# Patient Record
Sex: MALE | Race: WHITE | Employment: OTHER | URBAN - METROPOLITAN AREA
[De-identification: names, ages, dates, MRNs, and addresses within clinical notes are randomized per-mention and may not be internally consistent; named-entity substitution may affect disease eponyms.]

---

## 2017-10-23 ENCOUNTER — OFFICE VISIT (OUTPATIENT)
Dept: ORTHOPEDIC SURGERY | Age: 67
End: 2017-10-23

## 2017-10-23 VITALS
BODY MASS INDEX: 29.16 KG/M2 | HEART RATE: 76 BPM | DIASTOLIC BLOOD PRESSURE: 80 MMHG | WEIGHT: 220 LBS | HEIGHT: 73 IN | SYSTOLIC BLOOD PRESSURE: 138 MMHG

## 2017-10-23 DIAGNOSIS — M65.322 TRIGGER INDEX FINGER OF LEFT HAND: ICD-10-CM

## 2017-10-23 DIAGNOSIS — M79.645 FINGER PAIN, LEFT: Primary | ICD-10-CM

## 2017-10-23 PROCEDURE — 73140 X-RAY EXAM OF FINGER(S): CPT | Performed by: PHYSICIAN ASSISTANT

## 2017-10-23 PROCEDURE — 99213 OFFICE O/P EST LOW 20 MIN: CPT | Performed by: PHYSICIAN ASSISTANT

## 2017-10-23 NOTE — PATIENT INSTRUCTIONS
Trigger Finger: Care Instructions  Your Care Instructions  A trigger finger is a finger stuck in a bent position. The bent finger usually straightens out on its own. A trigger finger can be painful, but it normally is not a serious problem. Trigger fingers seem to occur more in some groups of people. These include people who have diabetes or arthritis or who have injured their hands in the past. This problem also occurs in musicians and people who  tools often. Rest, exercises, and other things you can do at home may help your trigger finger relax so that it can bend as it should. You may get a corticosteroid shot. This can reduce swelling and pain. Your doctor may put a splint on your finger. This will give your finger some rest and avoid irritating the joint. You may need surgery if the finger keeps locking in a bent position. Follow-up care is a key part of your treatment and safety. Be sure to make and go to all appointments, and call your doctor if you are having problems. It's also a good idea to know your test results and keep a list of the medicines you take. How can you care for yourself at home? · If your doctor put a splint on your finger, wear the splint as directed. Do not remove it until your doctor says you can. · You may need to change your activities to avoid movements that irritate the finger. · If your finger is swollen, put ice or a cold pack on your finger for 10 to 20 minutes at a time. Try to do this every 1 to 2 hours for the next 3 days (when you are awake) or until the swelling goes down. Put a thin cloth between the ice and your skin. · Prop up your hand on a pillow when you ice it or anytime you sit or lie down during the next 3 days. Try to keep it above the level of your heart. This will help reduce swelling. · Take your medicines exactly as prescribed. Call your doctor if you think you are having a problem with your medicine.   · Ask your doctor if you can take an over-the-counter pain medicine, such as acetaminophen (Tylenol), ibuprofen (Advil, Motrin), or naproxen (Aleve). Be safe with medicines. Read and follow all instructions on the label. · If your doctor recommends exercises, do them as directed. When should you call for help? Call your doctor now or seek immediate medical care if:  · Your finger locks in a bent position and will not straighten. Watch closely for changes in your health, and be sure to contact your doctor if:  · You do not get better as expected. Where can you learn more? Go to https://[employer]. org and sign in to your [employer] account. Enter M826 in the [employer] box to learn more about \"Trigger Finger: Care Instructions. \"     If you do not have an account, please click on the \"Sign Up Now\" link. Current as of: March 21, 2017  Content Version: 11.3  © 1084-9187 [employer]. Care instructions adapted under license by [facility] ([facility]). If you have questions about a medical condition or this instruction, always ask your healthcare professional. [place] [address] any warranty or liability for your use of this information.       Follow up with Dr Jveon Summers in 1 week

## 2017-10-30 ENCOUNTER — OFFICE VISIT (OUTPATIENT)
Dept: ORTHOPEDIC SURGERY | Age: 67
End: 2017-10-30

## 2017-10-30 VITALS
WEIGHT: 220.02 LBS | SYSTOLIC BLOOD PRESSURE: 145 MMHG | HEIGHT: 73 IN | DIASTOLIC BLOOD PRESSURE: 84 MMHG | HEART RATE: 78 BPM | BODY MASS INDEX: 29.16 KG/M2

## 2017-10-30 DIAGNOSIS — M65.322 TRIGGER INDEX FINGER OF LEFT HAND: Primary | ICD-10-CM

## 2017-10-30 PROCEDURE — 99203 OFFICE O/P NEW LOW 30 MIN: CPT | Performed by: ORTHOPAEDIC SURGERY

## 2017-10-30 PROCEDURE — 20550 NJX 1 TENDON SHEATH/LIGAMENT: CPT | Performed by: ORTHOPAEDIC SURGERY

## 2017-10-30 NOTE — PROGRESS NOTES
without enlargement or induration. Skin intact without lymphadenopathy, discoloration, or abnormal temperature. Vascular: There is intact, symmetric circulation in both upper extremities. Musculoskeletal       Cervical spine, shoulders, elbows, wrist:  satisfactory baseline range of motion, strength, and stability bilaterally        left index finger:                               moderate  tenderness at the A1 pulley                            moderate triggering with flexion and     extension. All other fingers/thumbs:   No tenderness at A1 pulley, no triggering with                                                   flexion/extension, and no locking    Neurological:  Sensation is subjectively normal in hands bilaterally    Radiology:     None needed today    Additional Studies:    IMPRESSION AND PLAN: Left index finger trigger digit    I discussed the entity of trigger finger with the patient. I fully outlined the mechanics behind the triggering and locking and appropriate conservative and surgical options. All their questions were answered fully. At this point the patient is symptomatic, and injection was recommended. Patient was in agreement. The risks and benefits of cortisone injection were discussed thoroughly. Risks discussed included infection, adverse drug reaction, increased pain post-injection, skin de-pigmentation, fatty atrophy, and persistent clinical symptoms despite injection. Use of ethyl chloride spray during injection to decrease injection site pain was discussed. The injection was given after cleansing the skin with alcohol. The left index finger trigger digit and flexor tendon sheath was injected with 1 mL of 1% lidocaine without epinephrine and 1 mL of Celestone without difficulty. The patient tolerated the injection well and a Band-aid was placed. Follow-up if symptoms are not resolving.     We appreciate the patient referral.  All questions and concerns were addressed today. Patient is in agreement with the plan.         Bashir Muniz MD  Hand & Upper Extremity Surgery  [address] [facility] ([facility])

## 2018-03-13 ENCOUNTER — OFFICE VISIT (OUTPATIENT)
Dept: ORTHOPEDIC SURGERY | Age: 68
End: 2018-03-13

## 2018-03-13 VITALS — HEIGHT: 71 IN | WEIGHT: 220.02 LBS | BODY MASS INDEX: 30.8 KG/M2

## 2018-03-13 DIAGNOSIS — M65.322 TRIGGER INDEX FINGER OF LEFT HAND: ICD-10-CM

## 2018-03-13 DIAGNOSIS — M79.644 FINGER PAIN, RIGHT: Primary | ICD-10-CM

## 2018-03-13 PROCEDURE — 99213 OFFICE O/P EST LOW 20 MIN: CPT | Performed by: ORTHOPAEDIC SURGERY

## 2018-03-13 PROCEDURE — 20550 NJX 1 TENDON SHEATH/LIGAMENT: CPT | Performed by: ORTHOPAEDIC SURGERY

## 2018-03-13 NOTE — PROGRESS NOTES
CC:  Left index finger pain and clicking    HISTORY OF PRESENT ILLNESS:   Margaret Perdomo is a 69 y.o. male right hand dominant, nondiabetic, who presents for evaluation and treatment of left index finger triggering that began approximately 3 months ago, but has worsened over the past 3-4 weeks with intermittent locking. This affects daily activities involving gripping. Treatment to date has been NSAID's and nighttime immobilization, without significant relief. Patient was seen here proximally 5 months ago for pain and triggering of the right index finger. Cortisone injection was given at that point with complete resolution of symptoms. Medical History:  Past Medical History:   Diagnosis Date    Hypertension     Migraines      Past Surgical History:   Procedure Laterality Date    CARDIAC CATHETERIZATION  2009    ablation for irrregular heartbeat     No family history on file. Social History     Social History    Marital status:      Spouse name: N/A    Number of children: N/A    Years of education: N/A     Social History Main Topics    Smoking status: Never Smoker    Smokeless tobacco: Never Used    Alcohol use 6.0 oz/week     5 Glasses of wine, 5 Cans of beer per week    Drug use: No    Sexual activity: Not Asked     Other Topics Concern    None     Social History Narrative    None     Current Outpatient Prescriptions   Medication Sig Dispense Refill    metoprolol (TOPROL-XL) 50 MG XL tablet Take 50 mg by mouth daily. No current facility-administered medications for this visit. Allergies   Allergen Reactions    Shellfish-Derived Products Shortness Of Breath       ROS:  ROS neg except for positives in HPI    PHYSICAL EXAMINATION:   Gen/Psych: Examination reveals a pleasant individual in no acute distress. The patient is oriented to time, place and person. The patient's mood and affect are appropriate. Lymph:  The lymphatic examination bilaterally reveals all areas to be

## 2018-03-13 NOTE — PROGRESS NOTES
INJECTION ADMINISTRATION DETAILS:    Date & Time:  3/13/18 3:27 PM  Site & Comments: left index finger  Administered by Dr Fidel Samuel    Betamethasone (30mg/mL)  #of CC:1  NDC #: 2913-8440-12  Lot #: [id number]  EXP: 8/2019    1% Lidocaine ( 10mg/mL)  # of CC : 1  NDC #: 3009-9915-36  LOT# :-[id number]  EXP :5/2019

## 2018-04-03 ENCOUNTER — OFFICE VISIT (OUTPATIENT)
Dept: ORTHOPEDIC SURGERY | Age: 68
End: 2018-04-03

## 2018-04-03 VITALS — HEIGHT: 71 IN | WEIGHT: 220.02 LBS | BODY MASS INDEX: 30.8 KG/M2

## 2018-04-03 DIAGNOSIS — M65.322 TRIGGER INDEX FINGER OF LEFT HAND: Primary | ICD-10-CM

## 2018-04-03 PROCEDURE — 99212 OFFICE O/P EST SF 10 MIN: CPT | Performed by: ORTHOPAEDIC SURGERY

## 2018-04-03 RX ORDER — SULFAMETHOXAZOLE AND TRIMETHOPRIM 800; 160 MG/1; MG/1
1 TABLET ORAL 2 TIMES DAILY
Qty: 20 TABLET | Refills: 0 | Status: SHIPPED | OUTPATIENT
Start: 2018-04-03 | End: 2018-04-11 | Stop reason: SDUPTHER

## 2018-04-05 ENCOUNTER — OFFICE VISIT (OUTPATIENT)
Dept: ORTHOPEDIC SURGERY | Age: 68
End: 2018-04-05

## 2018-04-05 DIAGNOSIS — L03.012 PARONYCHIA OF FINGER, LEFT: ICD-10-CM

## 2018-04-05 DIAGNOSIS — L03.012 PARONYCHIA OF LEFT INDEX FINGER: Primary | ICD-10-CM

## 2018-04-05 PROCEDURE — 99214 OFFICE O/P EST MOD 30 MIN: CPT | Performed by: ORTHOPAEDIC SURGERY

## 2018-04-05 PROCEDURE — 26011 DRAINAGE OF FINGER ABSCESS: CPT | Performed by: ORTHOPAEDIC SURGERY

## 2018-04-11 ENCOUNTER — OFFICE VISIT (OUTPATIENT)
Dept: ORTHOPEDIC SURGERY | Age: 68
End: 2018-04-11

## 2018-04-11 VITALS — HEIGHT: 71 IN | WEIGHT: 220.02 LBS | BODY MASS INDEX: 30.8 KG/M2

## 2018-04-11 DIAGNOSIS — L03.012 PARONYCHIA OF FINGER, LEFT: Primary | ICD-10-CM

## 2018-04-11 PROCEDURE — 99024 POSTOP FOLLOW-UP VISIT: CPT | Performed by: ORTHOPAEDIC SURGERY

## 2018-04-11 RX ORDER — CLINDAMYCIN HYDROCHLORIDE 150 MG/1
150 CAPSULE ORAL 4 TIMES DAILY
Qty: 40 CAPSULE | Refills: 0 | Status: SHIPPED | OUTPATIENT
Start: 2018-04-11 | End: 2018-04-21

## 2018-04-11 RX ORDER — SULFAMETHOXAZOLE AND TRIMETHOPRIM 800; 160 MG/1; MG/1
1 TABLET ORAL 2 TIMES DAILY
Qty: 20 TABLET | Refills: 0 | Status: SHIPPED | OUTPATIENT
Start: 2018-04-13 | End: 2018-04-23

## 2018-04-18 ENCOUNTER — OFFICE VISIT (OUTPATIENT)
Dept: ORTHOPEDIC SURGERY | Age: 68
End: 2018-04-18

## 2018-04-18 VITALS — BODY MASS INDEX: 30.8 KG/M2 | WEIGHT: 220.02 LBS | HEIGHT: 71 IN

## 2018-04-18 DIAGNOSIS — L03.012 PARONYCHIA OF FINGER, LEFT: Primary | ICD-10-CM

## 2018-04-18 PROCEDURE — 99212 OFFICE O/P EST SF 10 MIN: CPT | Performed by: ORTHOPAEDIC SURGERY

## 2018-08-21 ENCOUNTER — TELEPHONE (OUTPATIENT)
Dept: ORTHOPEDIC SURGERY | Age: 68
End: 2018-08-21

## 2018-08-21 ENCOUNTER — OFFICE VISIT (OUTPATIENT)
Dept: ORTHOPEDIC SURGERY | Age: 68
End: 2018-08-21

## 2018-08-21 VITALS — HEIGHT: 73 IN | WEIGHT: 215 LBS | BODY MASS INDEX: 28.49 KG/M2

## 2018-08-21 DIAGNOSIS — M65.331 TRIGGER MIDDLE FINGER OF RIGHT HAND: ICD-10-CM

## 2018-08-21 DIAGNOSIS — M65.322 TRIGGER INDEX FINGER OF LEFT HAND: Primary | ICD-10-CM

## 2018-08-21 PROCEDURE — 99213 OFFICE O/P EST LOW 20 MIN: CPT | Performed by: ORTHOPAEDIC SURGERY

## 2018-08-21 RX ORDER — AMLODIPINE BESYLATE 10 MG/1
TABLET ORAL
Refills: 11 | COMMUNITY
Start: 2018-07-24

## 2018-08-22 ENCOUNTER — TELEPHONE (OUTPATIENT)
Dept: ORTHOPEDIC SURGERY | Age: 68
End: 2018-08-22

## 2018-08-22 NOTE — PROGRESS NOTES
this transcription was created using voice recognition software. Any errors are unintentional and may be due to voice recognition transcription.

## 2018-09-26 ENCOUNTER — OFFICE VISIT (OUTPATIENT)
Dept: ORTHOPEDIC SURGERY | Age: 68
End: 2018-09-26
Payer: MEDICARE

## 2018-09-26 VITALS
BODY MASS INDEX: 28.49 KG/M2 | WEIGHT: 215 LBS | HEART RATE: 69 BPM | SYSTOLIC BLOOD PRESSURE: 129 MMHG | HEIGHT: 73 IN | DIASTOLIC BLOOD PRESSURE: 85 MMHG

## 2018-09-26 DIAGNOSIS — M65.331 TRIGGER MIDDLE FINGER OF RIGHT HAND: Primary | ICD-10-CM

## 2018-09-26 PROCEDURE — 20550 NJX 1 TENDON SHEATH/LIGAMENT: CPT | Performed by: ORTHOPAEDIC SURGERY

## 2018-09-26 PROCEDURE — 99213 OFFICE O/P EST LOW 20 MIN: CPT | Performed by: ORTHOPAEDIC SURGERY

## 2019-01-22 ENCOUNTER — TELEPHONE (OUTPATIENT)
Dept: ORTHOPEDIC SURGERY | Age: 69
End: 2019-01-22

## 2019-01-24 ENCOUNTER — TELEPHONE (OUTPATIENT)
Dept: ORTHOPEDIC SURGERY | Age: 69
End: 2019-01-24

## 2019-01-28 ENCOUNTER — TELEPHONE (OUTPATIENT)
Dept: ORTHOPEDIC SURGERY | Age: 69
End: 2019-01-28

## 2019-02-04 ENCOUNTER — HOSPITAL ENCOUNTER (OUTPATIENT)
Age: 69
Setting detail: OUTPATIENT SURGERY
Discharge: HOME | End: 2019-02-04
Attending: ORTHOPAEDIC SURGERY | Admitting: ORTHOPAEDIC SURGERY
Payer: MEDICARE

## 2019-02-04 VITALS
OXYGEN SATURATION: 98 % | HEART RATE: 57 BPM | DIASTOLIC BLOOD PRESSURE: 71 MMHG | RESPIRATION RATE: 16 BRPM | BODY MASS INDEX: 28.49 KG/M2 | HEIGHT: 73 IN | WEIGHT: 215 LBS | TEMPERATURE: 98.1 F | SYSTOLIC BLOOD PRESSURE: 112 MMHG

## 2019-02-04 PROCEDURE — 2500000003 HC RX 250 WO HCPCS: Performed by: ORTHOPAEDIC SURGERY

## 2019-02-04 PROCEDURE — 3600000012 HC SURGERY LEVEL 2 ADDTL 15MIN: Performed by: ORTHOPAEDIC SURGERY

## 2019-02-04 PROCEDURE — 7100000011 HC PHASE II RECOVERY - ADDTL 15 MIN: Performed by: ORTHOPAEDIC SURGERY

## 2019-02-04 PROCEDURE — 2709999900 HC NON-CHARGEABLE SUPPLY: Performed by: ORTHOPAEDIC SURGERY

## 2019-02-04 PROCEDURE — 7100000010 HC PHASE II RECOVERY - FIRST 15 MIN: Performed by: ORTHOPAEDIC SURGERY

## 2019-02-04 PROCEDURE — 3600000002 HC SURGERY LEVEL 2 BASE: Performed by: ORTHOPAEDIC SURGERY

## 2019-02-04 RX ORDER — BUPIVACAINE HYDROCHLORIDE 2.5 MG/ML
INJECTION, SOLUTION EPIDURAL; INFILTRATION; INTRACAUDAL
Status: DISCONTINUED
Start: 2019-02-04 | End: 2019-02-04 | Stop reason: HOSPADM

## 2019-02-04 RX ORDER — LIDOCAINE HYDROCHLORIDE 10 MG/ML
INJECTION, SOLUTION EPIDURAL; INFILTRATION; INTRACAUDAL; PERINEURAL
Status: DISCONTINUED
Start: 2019-02-04 | End: 2019-02-04 | Stop reason: HOSPADM

## 2019-02-04 ASSESSMENT — PAIN SCALES - GENERAL
PAINLEVEL_OUTOF10: 0
PAINLEVEL_OUTOF10: 0

## 2019-02-04 ASSESSMENT — PAIN - FUNCTIONAL ASSESSMENT: PAIN_FUNCTIONAL_ASSESSMENT: 0-10

## 2019-02-12 ENCOUNTER — OFFICE VISIT (OUTPATIENT)
Dept: ORTHOPEDIC SURGERY | Age: 69
End: 2019-02-12

## 2019-02-12 VITALS — WEIGHT: 214.95 LBS | BODY MASS INDEX: 28.49 KG/M2 | HEIGHT: 73 IN

## 2019-02-12 DIAGNOSIS — M65.331 TRIGGER MIDDLE FINGER OF RIGHT HAND: Primary | ICD-10-CM

## 2019-02-12 PROCEDURE — 99024 POSTOP FOLLOW-UP VISIT: CPT | Performed by: ORTHOPAEDIC SURGERY

## 2019-04-05 ENCOUNTER — OFFICE VISIT (OUTPATIENT)
Dept: ORTHOPEDIC SURGERY | Age: 69
End: 2019-04-05
Payer: MEDICARE

## 2019-04-05 VITALS — HEIGHT: 73 IN | WEIGHT: 214.95 LBS | BODY MASS INDEX: 28.49 KG/M2

## 2019-04-05 DIAGNOSIS — M65.331 TRIGGER MIDDLE FINGER OF RIGHT HAND: Primary | ICD-10-CM

## 2019-04-05 PROCEDURE — 99024 POSTOP FOLLOW-UP VISIT: CPT | Performed by: ORTHOPAEDIC SURGERY

## 2019-04-05 PROCEDURE — 20550 NJX 1 TENDON SHEATH/LIGAMENT: CPT | Performed by: ORTHOPAEDIC SURGERY

## 2019-04-05 NOTE — PROGRESS NOTES
INJECTION ADMINISTRATION DETAILS:    Date & Time:  4/5/19 8:57 AM  Site & Comments: right middle finger  Administered by Dr Ned Stark    Betamethasone (30mg/mL)  #of CC:1  NDC #: 5840-6414-60  Lot #: [id number]  EXP: 6/2020    1% Lidocaine ( 10mg/mL)  # of CC : 1  NDC #: 9706-8954-47  LOT# :[id number]  EXP :5/2019

## 2019-04-05 NOTE — PROGRESS NOTES
Chief Complaint   Patient presents with    Follow-up     Right middle finger trigger release sx 2/4/2019       HISTORY OF PRESENT ILLNESS:  Barrie Mancini is a 77 y.o.  patient here for post-op follow up after right long trigger digit release. Surgery was now 2 months ago. The patient is doing quite well overall. ROS:  ROS neg     MEDICAL HISTORY:  Patient's medications, allergies, past medical, surgical, social and family histories were reviewed and updated as appropriate. PHYSICAL EXAMINATION:  Alert and pleasant, no distress. The right hand and wrist is examined, including the operative digit. The wound is healed without signs of infection or dehiscense. Mild swelling still noted. There is satisfactory range of motion of the wrist and fingers. No clicking or triggering noted today. Mild finger stiffness. Fingers are well perfused and sensate. IMPRESSION AND PLAN:  right long trigger finger  Doing well after right long trigger digit release. We discussed scar and finger/tendon massage, progressive ROM of hand and fingers. Activities may be advanced depending on comfort. If patient desires hand therapy at any point during recovery, we will be happy to place this order. We discussed a postoperative right middle finger trigger digit injection today to help decrease the inflammation around the tendons. He was in agreement. The risks and benefits of cortisone injection were discussed thoroughly. Risks discussed included infection, adverse drug reaction, increased pain post-injection, skin de-pigmentation, fatty atrophy, and persistent clinical symptoms despite injection. Use of ethyl chloride spray during injection to decrease injection site pain was discussed. The injection was given after cleansing the skin with alcohol. Right middle finger and flexor tendon sheath were injected with 1 mL of 1% lidocaine without epinephrine and 1 mL of Celestone without difficulty.   The patient tolerated the injection well and a Band-aid was placed. Follow-up in 3 months if symptoms are not gone. All questions and concerns were addressed today. Patient is in agreement with the plan.         Deepthi Esquivel MD  Hand & Upper Extremity Surgery  [facility] of [facility] ([facility])

## 2019-09-24 ENCOUNTER — OFFICE VISIT (OUTPATIENT)
Dept: ORTHOPEDIC SURGERY | Age: 69
End: 2019-09-24
Payer: MEDICARE

## 2019-09-24 ENCOUNTER — HOSPITAL ENCOUNTER (OUTPATIENT)
Dept: OCCUPATIONAL THERAPY | Age: 69
Setting detail: THERAPIES SERIES
Discharge: HOME | End: 2019-09-24
Payer: MEDICARE

## 2019-09-24 VITALS — WEIGHT: 214.95 LBS | HEIGHT: 73 IN | BODY MASS INDEX: 28.49 KG/M2

## 2019-09-24 DIAGNOSIS — M25.641 FINGER STIFFNESS, RIGHT: ICD-10-CM

## 2019-09-24 DIAGNOSIS — M65.331 TRIGGER MIDDLE FINGER OF RIGHT HAND: Primary | ICD-10-CM

## 2019-09-24 PROCEDURE — L3925 FO PIP DIP JNT/SPRNG PRE OTS: HCPCS | Performed by: OCCUPATIONAL THERAPIST

## 2019-09-24 PROCEDURE — 20550 NJX 1 TENDON SHEATH/LIGAMENT: CPT | Performed by: ORTHOPAEDIC SURGERY

## 2019-09-24 PROCEDURE — 99213 OFFICE O/P EST LOW 20 MIN: CPT | Performed by: ORTHOPAEDIC SURGERY

## 2019-09-24 RX ORDER — BETAMETHASONE SODIUM PHOSPHATE AND BETAMETHASONE ACETATE 3; 3 MG/ML; MG/ML
12 INJECTION, SUSPENSION INTRA-ARTICULAR; INTRALESIONAL; INTRAMUSCULAR; SOFT TISSUE ONCE
Status: COMPLETED | OUTPATIENT
Start: 2019-09-24 | End: 2019-09-24

## 2019-09-24 RX ADMIN — BETAMETHASONE SODIUM PHOSPHATE AND BETAMETHASONE ACETATE 12 MG: 3; 3 INJECTION, SUSPENSION INTRA-ARTICULAR; INTRALESIONAL; INTRAMUSCULAR; SOFT TISSUE at 09:14

## 2019-09-24 NOTE — PROGRESS NOTES
Chief Complaint   Patient presents with    Follow-up     Right middle finger trigger release sx 2/4/2019       HISTORY OF PRESENT ILLNESS:  Sonu Ashby is a 68 y.o.  patient here for repeat evaluation after undergoing right middle finger trigger digit release almost 7 months ago. Triggering is gone. He has good  strength. He has stiffness of the middle finger in the morning and has not been able to fully regain extension of the PIP joint    ROS:  ROS neg     Past medical history is reviewed again today, no changes to report    PHYSICAL EXAMINATION:  Patient is alert and pleasant, in no acute distress. The affected extremity is examined today. Right middle finger reveals a 15 degree PIP joint flexion contracture that is not passively correctable. Full flexion. Intact tendon function. No pain or triggering over the A1 pulley of the right middle finger. Prior incision is well-healed. Good  strength. Digits sensate    X-rays: None today    IMPRESSION AND PLAN: Right middle finger trigger digit, right middle finger PIP joint stiffness  We discussed repeat cortisone injection into the right middle finger flexor tendon sheath today along with a therapy evaluation for a dynamic PIP joint extension brace to be worn at night. Follow-up in the next 8 to 10 weeks for repeat assessment. The risks and benefits of cortisone injection were discussed thoroughly. Risks discussed included infection, adverse drug reaction, increased pain post-injection, skin de-pigmentation, fatty atrophy, and persistent clinical symptoms despite injection. Use of ethyl chloride spray during injection to decrease injection site pain was discussed. The injection was given after cleansing the skin with alcohol. Right middle finger flexor tendon sheath was injected with 1 cc of 1% lidocaine without epinephrine 1 cc of Celestone without difficulty. The patient tolerated the injection well and a Band-aid was placed.      All

## 2020-01-14 ENCOUNTER — OFFICE VISIT (OUTPATIENT)
Dept: ORTHOPEDIC SURGERY | Age: 70
End: 2020-01-14
Payer: MEDICARE

## 2020-01-14 VITALS — BODY MASS INDEX: 28.49 KG/M2 | WEIGHT: 214.95 LBS | HEIGHT: 73 IN

## 2020-01-14 PROBLEM — M65.332 TRIGGER MIDDLE FINGER OF LEFT HAND: Status: ACTIVE | Noted: 2017-10-23

## 2020-01-14 PROCEDURE — 99213 OFFICE O/P EST LOW 20 MIN: CPT | Performed by: ORTHOPAEDIC SURGERY

## 2020-01-14 PROCEDURE — 20550 NJX 1 TENDON SHEATH/LIGAMENT: CPT | Performed by: ORTHOPAEDIC SURGERY

## 2020-01-14 RX ORDER — BETAMETHASONE SODIUM PHOSPHATE AND BETAMETHASONE ACETATE 3; 3 MG/ML; MG/ML
12 INJECTION, SUSPENSION INTRA-ARTICULAR; INTRALESIONAL; INTRAMUSCULAR; SOFT TISSUE ONCE
Status: COMPLETED | OUTPATIENT
Start: 2020-01-14 | End: 2020-01-14

## 2020-01-14 RX ADMIN — BETAMETHASONE SODIUM PHOSPHATE AND BETAMETHASONE ACETATE 12 MG: 3; 3 INJECTION, SUSPENSION INTRA-ARTICULAR; INTRALESIONAL; INTRAMUSCULAR; SOFT TISSUE at 08:37

## 2020-01-14 NOTE — PROGRESS NOTES
1% Lidocaine ( 10mg/mL)  # of CC : 1  NDC #: 6158-5265-29  LOT# :[id number]  EXP :11/2020
Relationship status: Not on file    Intimate partner violence:     Fear of current or ex partner: Not on file     Emotionally abused: Not on file     Physically abused: Not on file     Forced sexual activity: Not on file   Other Topics Concern    Not on file   Social History Narrative    Not on file     Current Outpatient Medications   Medication Sig Dispense Refill    amLODIPine (NORVASC) 10 MG tablet TAKE 1 TABLET BY MOUTH DAILY. 11    metoprolol (TOPROL-XL) 50 MG XL tablet Take 50 mg by mouth daily. No current facility-administered medications for this visit. Allergies   Allergen Reactions    Shellfish-Derived Products Shortness Of Breath       ROS:  ROS neg except for positives in HPI    PHYSICAL EXAMINATION:   Gen/Psych: Examination reveals a pleasant individual in no acute distress. The patient is oriented to time, place and person. The patient's mood and affect are appropriate. Lymph: The lymphatic examination bilaterally reveals all areas to be without enlargement or induration. Skin intact without lymphadenopathy, discoloration, or abnormal temperature. Vascular: There is intact, symmetric circulation in both upper extremities. Musculoskeletal       Cervical spine, shoulders, elbows, wrist:  satisfactory baseline range of motion, strength, and stability bilaterally        left long finger:                               moderate  tenderness at the A1 pulley                            moderate triggering with flexion and     extension. All other fingers/thumbs:   No tenderness at A1 pulley, no triggering with                                                   flexion/extension, and no locking    Neurological:  Sensation is subjectively normal in hands bilaterally    Radiology:         IMPRESSION AND PLAN: Left middle finger trigger digit    I discussed the entity of trigger finger with the patient.  I fully outlined the mechanics behind the triggering and locking and

## 2020-06-09 ENCOUNTER — OFFICE VISIT (OUTPATIENT)
Dept: ORTHOPEDIC SURGERY | Age: 70
End: 2020-06-09
Payer: MEDICARE

## 2020-06-09 VITALS — WEIGHT: 214.95 LBS | HEIGHT: 73 IN | BODY MASS INDEX: 28.49 KG/M2

## 2020-06-09 PROCEDURE — 99213 OFFICE O/P EST LOW 20 MIN: CPT | Performed by: ORTHOPAEDIC SURGERY

## 2020-06-09 PROCEDURE — 20550 NJX 1 TENDON SHEATH/LIGAMENT: CPT | Performed by: ORTHOPAEDIC SURGERY

## 2020-06-09 RX ORDER — BETAMETHASONE SODIUM PHOSPHATE AND BETAMETHASONE ACETATE 3; 3 MG/ML; MG/ML
1 INJECTION, SUSPENSION INTRA-ARTICULAR; INTRALESIONAL; INTRAMUSCULAR; SOFT TISSUE ONCE
Status: COMPLETED | OUTPATIENT
Start: 2020-06-09 | End: 2020-06-09

## 2020-06-09 RX ORDER — LIDOCAINE HYDROCHLORIDE 10 MG/ML
1 INJECTION, SOLUTION INFILTRATION; PERINEURAL ONCE
Status: COMPLETED | OUTPATIENT
Start: 2020-06-09 | End: 2020-06-09

## 2020-06-09 RX ADMIN — LIDOCAINE HYDROCHLORIDE 1 ML: 10 INJECTION, SOLUTION INFILTRATION; PERINEURAL at 09:20

## 2020-06-09 RX ADMIN — BETAMETHASONE SODIUM PHOSPHATE AND BETAMETHASONE ACETATE 1.02 MG: 3; 3 INJECTION, SUSPENSION INTRA-ARTICULAR; INTRALESIONAL; INTRAMUSCULAR; SOFT TISSUE at 09:19

## 2020-10-19 ENCOUNTER — OFFICE VISIT (OUTPATIENT)
Dept: ORTHOPEDIC SURGERY | Age: 70
End: 2020-10-19
Payer: MEDICARE

## 2020-10-19 VITALS — BODY MASS INDEX: 28.99 KG/M2 | HEIGHT: 72 IN | WEIGHT: 214 LBS

## 2020-10-19 PROCEDURE — 20550 NJX 1 TENDON SHEATH/LIGAMENT: CPT | Performed by: ORTHOPAEDIC SURGERY

## 2020-10-19 PROCEDURE — 99213 OFFICE O/P EST LOW 20 MIN: CPT | Performed by: ORTHOPAEDIC SURGERY

## 2020-10-19 RX ORDER — LIDOCAINE HYDROCHLORIDE 10 MG/ML
20 INJECTION, SOLUTION INFILTRATION; PERINEURAL ONCE
Status: COMPLETED | OUTPATIENT
Start: 2020-10-19 | End: 2020-10-19

## 2020-10-19 RX ORDER — BETAMETHASONE SODIUM PHOSPHATE AND BETAMETHASONE ACETATE 3; 3 MG/ML; MG/ML
12 INJECTION, SUSPENSION INTRA-ARTICULAR; INTRALESIONAL; INTRAMUSCULAR; SOFT TISSUE ONCE
Status: COMPLETED | OUTPATIENT
Start: 2020-10-19 | End: 2020-10-19

## 2020-10-19 RX ADMIN — LIDOCAINE HYDROCHLORIDE 20 ML: 10 INJECTION, SOLUTION INFILTRATION; PERINEURAL at 13:13

## 2020-10-19 RX ADMIN — BETAMETHASONE SODIUM PHOSPHATE AND BETAMETHASONE ACETATE 12 MG: 3; 3 INJECTION, SUSPENSION INTRA-ARTICULAR; INTRALESIONAL; INTRAMUSCULAR; SOFT TISSUE at 13:11

## 2020-10-19 NOTE — PROGRESS NOTES
Chief Complaint   Patient presents with    Follow-up     check left middle finger       HISTORY OF PRESENT ILLNESS:  Lexi Quintanilla is a 65 y.o.  patient here for repeat evaluation regarding left middle finger triggering. Cortisone injections in the past of helped for several months. He denies new injury. Comes in today with some recurrence of symptoms over the past couple weeks. ROS:  ROS neg     Past medical history is reviewed again today, no changes to report    PHYSICAL EXAMINATION:  Patient is alert and pleasant, in no acute distress. The affected extremity is examined today. Pain over the A1 pulley left middle finger, palpable click but no LOCk. Mild swelling. No prior injection site change. Fingers warm and sensate      IMPRESSION AND PLAN: left middle finger trigger digit  We repeat cortisone injection versus trigger digit release. He was hopeful to avoid surgery still at this point and does request repeat injection if possible. The risks and benefits of cortisone injection were discussed thoroughly. Risks discussed included infection, adverse drug reaction, increased pain post-injection, skin de-pigmentation, fatty atrophy, and persistent clinical symptoms despite injection. Use of ethyl chloride spray during injection to decrease injection site pain was discussed. The injection was given after cleansing the skin with alcohol. Left middle finger trigger digit and flexor tendon sheath was injected with 1 cc of 1% lidocaine without epinephrine and 1 cc of Celestone without difficulty. The patient tolerated the injection well and a Band-aid was placed. Conservative measures as outlined previously and follow-up as needed. All questions and concerns were addressed today. Patient is in agreement with the plan.         Harry Harper MD  Hand & Upper Extremity Surgery  [facility]  A partner of [facility] ([facility])        Please note that this transcription was created using voice recognition software. Any errors are unintentional and may be due to voice recognition transcription.

## 2020-10-26 ENCOUNTER — OFFICE VISIT (OUTPATIENT)
Dept: ORTHOPEDIC SURGERY | Age: 70
End: 2020-10-26
Payer: MEDICARE

## 2020-10-26 VITALS — WEIGHT: 214 LBS | HEIGHT: 72 IN | BODY MASS INDEX: 28.99 KG/M2

## 2020-10-26 PROCEDURE — 99212 OFFICE O/P EST SF 10 MIN: CPT | Performed by: ORTHOPAEDIC SURGERY

## 2020-10-26 RX ORDER — FINASTERIDE 5 MG/1
1 TABLET, FILM COATED ORAL DAILY
COMMUNITY
Start: 2020-09-13

## 2020-10-26 RX ORDER — MELOXICAM 15 MG/1
15 TABLET ORAL DAILY
Qty: 90 TABLET | Refills: 0 | Status: SHIPPED | OUTPATIENT
Start: 2020-10-26

## 2020-10-26 RX ORDER — METOPROLOL TARTRATE 100 MG/1
TABLET ORAL
COMMUNITY
Start: 2020-09-15

## 2020-10-26 RX ORDER — LOSARTAN POTASSIUM 25 MG/1
25 TABLET ORAL DAILY
COMMUNITY
Start: 2020-06-30 | End: 2020-12-27

## 2020-10-26 NOTE — PROGRESS NOTES
SHOULDER CONSULTATION    Referring Provider: Dr. Yina Lopez    Primary Care Provider: Dr. Marcelo Zelaya    Chief Complaint    Injury (Patient felt a tear in his shoulder roughly a month ago playing pickleball )      History of Present Illness:  Kemi Ponce is a 65 y.o. male who presents thoughtfully referred for several weeks of pain in the lateral deltoid referred to the lateral elbow. Tickly pain with abduction and internal rotation. Is very active right-hand-dominant gentleman for his age. He has had some low-grade problems with the shoulder but never to this degree. He is playing pickle ball recently when he felt a bit of a pop. He remains quite functional is just really frustrated by this discomfort with abduction. He has only mild night pain. He has no radicular symptoms. Pain as outlined below    Pain Assessment  Location of Pain: Shoulder  Location Modifiers: Right  Severity of Pain: 4  Quality of Pain: Aching, Sharp  Frequency of Pain: Intermittent  Aggravating Factors: Stretching, Straightening  Relieving Factors: Rest  Result of Injury: Yes  Work-Related Injury: No  Are there other pain locations you wish to document?: No    Medical History:  Patient's medications, allergies, past medical, surgical, social and family histories were reviewed and updated as appropriate. Review of Systems:  Pertinent items are noted in HPI  Review of systems reviewed from Patient History Form dated on October 26 and available in the patient's chart under the Media tab. Vitals:    10/26/20 0905   Weight: 214 lb (97.1 kg)   Height: 6' (1.829 m)           General Exam:   Constitutional: Patient is adequately groomed with no evidence of malnutrition  Mental Status: The patient is oriented to time, place and person. The patient's mood and affect are appropriate. Vascular: Examination reveals no swelling or calf tenderness. Peripheral pulses are palpable and 2+.   Neurological: The patient has good coordination. There is no weakness or sensory deficit. Shoulder Examination:    Inspection: He has a very mild type II scapular dyskinesis, he has no obvious atrophy    Palpation: Subtle tenderness at the apex the bicipital groove    Range of Motion: Is very nice arc of glenohumeral movement with good scapulohumeral rhythm    Strength: Isometric strength testing is full    Special Tests: Normal sensation light touch axillary nerve, pain with Jobes and O'Briens is modest.  Mild Neer and Gr. Nontender acromioclavicular. He does indeed have some positive pain with long head biceps provocative    Skin: There are no rashes, ulcerations or lesions. Gait: No assist device    Spine good cervical rotation    Additional Comments:         Radiology:     X-rays obtained reviewed the office today include 4 views of the right shoulder. He has very mild grade 2 arthritic disease glenohumeral joint. Small acromial spur. Otherwise concentrically humeral alignment. Perhaps a very subtle calcification at the lateral tuberosity      Assessment : Elvira Anders presents with the impressions of an acute exacerbation of some chronic rotator cuff pathology. Likely some involvement of the transverse ligament affecting the long head biceps at the outlet      Office Procedures:  Orders Placed This Encounter   Procedures    XR SHOULDER RIGHT (MIN 2 VIEWS)       Treatment Plan: At this point he retains good range of movement and isometric strength. We discussed the pathology of degenerative rotator cuff tears and as such at this time I think is a good candidate for first-line conservative treatment. He is going well in a physical therapy protocol based upon the moon program.  He can begin with some Mobic at first but call me for a corticosteroid injection if not improving. Plan to reassess in 6 weeks how he is responding he understands arthroscopy or MRI is really only the salvage for failure of this plan of treatment.

## 2020-10-28 ENCOUNTER — HOSPITAL ENCOUNTER (OUTPATIENT)
Dept: PHYSICAL THERAPY | Age: 70
Setting detail: THERAPIES SERIES
Discharge: HOME | End: 2020-10-28
Payer: MEDICARE

## 2020-10-28 PROCEDURE — 97161 PT EVAL LOW COMPLEX 20 MIN: CPT

## 2020-10-28 PROCEDURE — 97140 MANUAL THERAPY 1/> REGIONS: CPT

## 2020-10-28 PROCEDURE — 97110 THERAPEUTIC EXERCISES: CPT

## 2020-10-28 NOTE — FLOWSHEET NOTE
[facility],  [address] [place]  Phone: [phone]  Fax [phone]        Date:  10/28/2020    Patient Name:  Shell Yan    :  1950  MRN: [id number]  Restrictions/Precautions:    Medical/Treatment Diagnosis Information:  Diagnosis: R84.408 (ICD-10-CM) - Right shoulder pain, unspecified chronicity; S46.001 (ICD-10-CM) - Injury of right rotator cuff  Treatment Diagnosis: Right Shoulder Pain (M25.511); Right Rotator Cuff Tendinopathy (K12.121)  Insurance/Certification information:  PT Insurance Information: MED MUT  Physician Information:  Referring Practitioner: Patient will be able to reach out to the side without the onset of right shoulder pain.   Has the plan of care been signed (Y/N):        []  Yes  [x]  No     Date of Patient follow up with Physician: 20      Is this a Progress Report:     []  Yes  [x]  No        If Yes:  Date Range for reporting period:  Beginning: 10/28/20  Ending:    Progress report will be due (10 Rx or 30 days whichever is less):        Recertification will be due (POC Duration  / 90 days whichever is less): 20       Visit # Insurance Allowable Auth Required   1 ??? []  Yes []  No        Functional Scale (QuickDASH):  10/28/20: 15.9% Disability (18/55)       Therapy Diagnosis/Practice Pattern: E     Number of Comorbidities:  []0     [x]1-2    []3+    Latex Allergy:  [x]NO      []YES  Preferred Language for Healthcare:   [x]English       []other:      Pain level:  10     SUBJECTIVE:  See eval    OBJECTIVE: See eval   Observation:    Test measurements:      RESTRICTIONS/PRECAUTIONS: None    Exercises/Interventions:     Therapeutic Ex (44111) Volume Notes/CUES   Circuit (1x):  -Supine Pec Stretch  -Supine Erath   12x  12x    Circuit (1x):  -Serratus Wall SLides  -Low Trap Lift Offs  -TB IR  -Scaption  -TB ER   12x, yellow  12x  12x, red  12x, BW  12x, red HEP Education 4 min         Manual Intervention (07838) 12 min    Grade II-IV posterior and inferior glides of the right GHJ     STM to the right posterior RTC, teres major, and lat     Grade III upward rotation of the right scapula                    NMR re-education (56693)  CUES NEEDED                                                Therapeutic Activity (54738)                                   Additional time was spent explaining exercise instruction, exercise set up, and rest breaks. Patient Education: Patient educated on examination findings, plan of care, and home exercise program. Patient received verbal instructions to immediately inform their attending clinician if they endured increased discomfort or perceived any component of their therapy to be counterproductive, so that their therapeutic parameters could be modified accordingly. Patient acknowledged these instructions and agreed to comply. Therapeutic Exercise and NMR EXR  [x] (68389) Provided verbal/tactile cueing for activities related to strengthening, flexibility, endurance, ROM  for improvements in scapular, scapulothoracic and UE control with self care, reaching, carrying, lifting, house/yardwork, driving/computer work.    [] (89801) Provided verbal/tactile cueing for activities related to improving balance, coordination, kinesthetic sense, posture, motor skill, proprioception  to assist with  scapular, scapulothoracic and UE control with self care, reaching, carrying, lifting, house/yardwork, driving/computer work. Therapeutic Activities:    [] (85742 or 30115) Provided verbal/tactile cueing for activities related to improving balance, coordination, kinesthetic sense, posture, motor skill, proprioception and motor activation to allow for proper function of scapular, scapulothoracic and UE control with self care, carrying, lifting, driving/computer work.      Home Exercise Program:    [x] (56498) Reviewed/Progressed HEP activities related to strengthening, flexibility, endurance, ROM of scapular, scapulothoracic and UE control with self care, reaching, carrying, lifting, house/yardwork, driving/computer work  [] (96156) Reviewed/Progressed HEP activities related to improving balance, coordination, kinesthetic sense, posture, motor skill, proprioception of scapular, scapulothoracic and UE control with self care, reaching, carrying, lifting, house/yardwork, driving/computer work      Manual Treatments:  PROM / STM / Oscillations-Mobs:  G-I, II, III, IV (PA's, Inf., Post.)  [x] (91170) Provided manual therapy to mobilize soft tissue/joints of cervical/CT, scapular GHJ and UE for the purpose of modulating pain, promoting relaxation,  increasing ROM, reducing/eliminating soft tissue swelling/inflammation/restriction, improving soft tissue extensibility and allowing for proper ROM for normal function with self care, reaching, carrying, lifting, house/yardwork, driving/computer work    Modalities:      Charges:  Timed Code Treatment Minutes: 30   Total Treatment Minutes: 45     Time In: 1:35pm  Time Out: 2:20pm        [x] EVAL (LOW) 07357 (typically 20 minutes face-to-face)  [] EVAL (MOD) 15813 (typically 30 minutes face-to-face)  [] EVAL (HIGH) 96770 (typically 45 minutes face-to-face)  [] RE-EVAL     [x] VC(11552) x 1    [] IONTO  [] NMR (81348) x     [] VASO  [x] Manual (74274) x 1     [] Other:  [] TA x      [] Mech Traction (24390)  [] ES(attended) (74464)      [] ES (un) (67819):     GOALS:  Patient stated goal: Patient will be able to reach out to his right side without the onset of right shoulder pain. [] Progressing: [] Met: [] Not Met: [] Adjusted    Therapist goals for Patient:   Short Term Goals: To be achieved in: 2 weeks  1. Independent in HEP and progression per patient tolerance, in order to prevent re-injury. [] Progressing: [] Met: [] Not Met: [] Adjusted  2.  Patient will have a decrease in pain to facilitate improvement in movement, function, and ADLs as indicated by Functional Deficits. [] Progressing: [] Met: [] Not Met: [] Adjusted    Long Term Goals: To be achieved in: 8 weeks  1. Disability index score of 7% or less for the [facility] to assist with reaching prior level of function. [] Progressing: [] Met: [] Not Met: [] Adjusted  2. Patient will demonstrate symmetrical and pain-free arm elevation between the affected and unaffected side. [] Progressing: [] Met: [] Not Met: [] Adjusted  3. Patient will be able to sleep on his right side without the onset of right shoulder pain. [] Progressing: [] Met: [] Not Met: [] Adjusted  4. Patient will be able to mimic UE painting motions without the onset of right shoulder pain. [] Progressing: [] Met: [] Not Met: [] Adjusted  5. Patient will be able to play pickleball without the onset of right shoulder pain. [] Progressing: [] Met: [] Not Met: [] Adjusted     Progression Towards Functional goals:  [] Patient is progressing as expected towards functional goals listed. [] Progression is slowed due to complexities listed. [] Progression has been slowed due to co-morbidities. [x] Plan just implemented, too soon to assess goals progression  [] Other:     ASSESSMENT:  See cholo    Overall Progression Towards Functional goals/ Treatment Progress Update:  [] Patient is progressing as expected towards functional goals listed. [] Progression is slowed due to complexities/Impairments listed. [] Progression has been slowed due to co-morbidities.   [x] Plan just implemented, too soon to assess goals progression <30days   [] Goals require adjustment due to lack of progress  [] Patient is not progressing as expected and requires additional follow up with physician  [] Other    Prognosis for POC: [x] Good [] Fair  [] Poor      Patient requires continued skilled intervention: [x] Yes  [] No    Treatment/Activity Tolerance:  [x] Patient able to complete treatment  [] Patient limited by fatigue  [] Patient limited by pain    [] Patient limited by other medical complications  [] Other:         Return to Play: (if applicable)   []  Stage 1: Intro to Strength   []  Stage 2: Return to Run and Strength   []  Stage 3: Return to Jump and Strength   []  Stage 4: Dynamic Strength and Agility   []  Stage 5: Sport Specific Training     []  Ready to Return to Play, Meets All Above Stages   []  Not Ready for Return to Sports   Comments:                               PLAN: 1-2x per week for 8 weeks (beginning 10/28/20, ending 12/23/20)  [] Continue per plan of care [] Alter current plan (see comments above)  [x] Plan of care initiated [] Hold pending MD visit [] Discharge      Electronically signed by:  Gunner Cash PT, DPT ([place] PT License #: [id number])    Note: If patient does not return for scheduled/ recommended follow up visits, this note will serve as a discharge from care along with most recent update on progress.

## 2020-10-28 NOTE — PLAN OF CARE
bathroom and had pain with up an down movements involved with painting. He has another bathroom to do around [place]. No pain past his elbow. Relevant Medical History: Hypertension (controlled with medication); menisectomy (25 y.o.)    Functional Disability Index (QuickDASH) 15.9% disability (18/55):     Pain Scale: 2/10  Easing factors: Rest  Provocative factors: Reaching, Sleeping, overhead movements     Type: []Constant   []Intermittent  []Radiating []Localized []other:     Numbness/Tingling: None    Occupation/School: Retired    Living Status/Prior Level of Function: Independent with all ADLs and IADLs    OBJECTIVE:     CERV ROM   NOTES   Cervical Flexion WNL     Cervical Extension WNL     Cervical SB WNL WNL    Cervical rotation WNL WNL          AROM Left Right    Shoulder Flex 140 137    Shoulder Functional ER T2 T2    Shoulder Functional IR T9 T9                      Strength  Left Right    Shoulder Abd 5/5 5/5, 2/10 pain    Shoulder Scap 5/5 4/5, 1/10 pain    Shoulder ER 5/5 4/5, 6/10 pain    Shoulder IR 5/5 5/5                      Joint mobility      GHJ Inferiror Glide  Hypomobile    GHJ Posterior Glide  Hypomobile    Scapular Upward Rotation  Hypomobile                                    Reflexes/Sensation: No sensory deficits reported   []Dermatomes/Myotomes intact    []Reflexes equal and normal bilaterally   []Other:    Joint mobility:    []Normal    [x]Hypo   []Hyper    Palpation: No TTP of right LHBT, supraspinatus tendon, or posterior RTC tendon. Functional Mobility/Transfers: WNL    Posture: Forward Head, rounded shoulders bilaterally    Gait: (include devices/WB status): WNLWNL                       [x] Patient history, allergies, meds reviewed. Medical chart reviewed. See intake form. Review Of Systems (ROS):  [x]Performed Review of systems (Integumentary, CardioPulmonary, Neurological) by intake and observation. Intake form has been scanned into medical record.  Patient has been instructed to contact their primary care physician regarding ROS issues if not already being addressed at this time. Co-morbidities/Complexities (which will affect course of rehabilitation):   []None           Arthritic conditions   []Rheumatoid arthritis (M05.9)  []Osteoarthritis (M19.91)   Cardiovascular conditions   [x]Hypertension (I10)  []Hyperlipidemia (E78.5)  []Angina pectoris (I20)  []Atherosclerosis (I70)   Musculoskeletal conditions   []Disc pathology   []Congenital spine pathologies   [x]Prior surgical intervention  []Osteoporosis (M81.8)  []Osteopenia (M85.8)   Endocrine conditions   []Hypothyroid (E03.9)  []Hyperthyroid Gastrointestinal conditions   []Constipation (D33.98)   Metabolic conditions   []Morbid obesity (E66.01)  []Diabetes type 1(E10.65) or 2 (E11.65)   []Neuropathy (G60.9)     Pulmonary conditions   []Asthma (J45)  []Coughing   []COPD (J44.9)   Psychological Disorders  []Anxiety (F41.9)  []Depression (F32.9)   []Other:   []Other:          Barriers to/and or personal factors that will affect rehab potential:              [x]Age  []Sex              []Motivation/Lack of Motivation                        []Co-Morbidities              []Cognitive Function, education/learning barriers              []Environmental, home barriers              []profession/work barriers  []past PT/medical experience  []other:  Justification: Age may slow healing timeline      Falls Risk Assessment (30 days):   [x] Falls Risk assessed and no intervention required.   [] Falls Risk assessed and Patient requires intervention due to being higher risk   TUG score (>12s at risk):     [] Falls education provided, including       C-SSRS Triggered by Intake questionnaire (Past 2 wk assessment):   [] No, Questionnaire did not trigger screening.   [] Yes, Patient intake triggered further evaluation       C-SSRS Screening completed   PCP notified via Plan of Care   Emergency services notified         ASSESSMENT: Functional Impairments   [x]Noted spinal or UE joint hypomobility   []Noted spinal or UE joint hypermobility   [x]Decreased UE functional ROM   [x]Decreased UE functional strength   []Abnormal reflexes/sensation/myotomal/dermatomal deficits   [x]Decreased RC/scapular/core strength and neuromuscular control   []other:      Functional Activity Limitations (from functional questionnaire and intake)   []Reduced ability to tolerate prolonged functional positions   []Reduced ability or difficulty with changes of positions or transfers between positions   []Reduced ability to maintain good posture and demonstrate good body mechanics with sitting, bending, and lifting   [] Reduced ability or tolerance with driving and/or computer work   [x]Reduced ability to sleep   [x]Reduced ability to perform lifting, reaching, carrying tasks   [x]Reduced ability to tolerate impact through UE   []Reduced ability to reach behind back   []Reduced ability to  or hold objects   [x]Reduced ability to throw or toss an object   []other:    Participation Restrictions   []Reduced participation in self care activities   []Reduced participation in home management activities   []Reduced participation in work activities   []Reduced participation in social activities. [x]Reduced participation in sport/recreation activities. Classification:   []Signs/symptoms consistent with post-surgical status including decreased ROM, strength and function.   []Signs/symptoms consistent with joint sprain/strain   [x]Signs/symptoms consistent with shoulder impingement   []Signs/symptoms consistent with shoulder/elbow/wrist tendinopathy   []Signs/symptoms consistent with Rotator cuff tear   []Signs/symptoms consistent with labral tear   []Signs/symptoms consistent with postural dysfunction    []Signs/symptoms consistent with Glenohumeral IR Deficit - <45 degrees   []Signs/symptoms consistent with facet dysfunction of cervical/thoracic spine    []Signs/symptoms consistent with pathology which may benefit from Dry needling     []other:     Prognosis/Rehab Potential:      []Excellent   [x]Good    []Fair   []Poor    Tolerance of evaluation/treatment:    []Excellent   [x]Good    []Fair   []Poor  Physical Therapy Evaluation Complexity Justification  [x] A history of present problem with:  [] no personal factors and/or comorbidities that impact the plan of care;  [x]1-2 personal factors and/or comorbidities that impact the plan of care  []3 personal factors and/or comorbidities that impact the plan of care  [x] An examination of body systems using standardized tests and measures addressing any of the following: body structures and functions (impairments), activity limitations, and/or participation restrictions;:  [] a total of 1-2 or more elements   [x] a total of 3 or more elements   [] a total of 4 or more elements   [x] A clinical presentation with:  [x] stable and/or uncomplicated characteristics   [] evolving clinical presentation with changing characteristics  [] unstable and unpredictable characteristics;   [x] Clinical decision making of [x] low, [] moderate, [] high complexity using standardized patient assessment instrument and/or measurable assessment of functional outcome. [x] EVAL (LOW) 00531 (typically 20 minutes face-to-face)  [] EVAL (MOD) 78063 (typically 30 minutes face-to-face)  [] EVAL (HIGH) 06057 (typically 45 minutes face-to-face)  [] RE-EVAL       PLAN:  Frequency/Duration:  1-2x per week for 8 weeks (beginning 10/28/20, ending 12/23/20)  INTERVENTIONS:  [x] Therapeutic exercise including: strength training, ROM, for Upper extremity and core   [x]  NMR activation and proprioception for UE, scap and Core   [x] Manual therapy as indicated for shoulder, scapula and spine to include: Dry Needling/IASTM, STM, PROM, Gr I-IV mobilizations, manipulation.    [x] Modalities as needed that may include: thermal agents, E-stim, Biofeedback,

## 2020-10-30 ENCOUNTER — HOSPITAL ENCOUNTER (OUTPATIENT)
Dept: PHYSICAL THERAPY | Age: 70
Setting detail: THERAPIES SERIES
Discharge: HOME | End: 2020-10-30
Payer: MEDICARE

## 2020-10-30 PROCEDURE — 97110 THERAPEUTIC EXERCISES: CPT

## 2020-10-30 PROCEDURE — 97140 MANUAL THERAPY 1/> REGIONS: CPT

## 2020-10-30 NOTE — FLOWSHEET NOTE
[facility],  [address] [place]  Phone: [phone]  Fax [phone]        Date:  10/30/2020    Patient Name:  Chad Nassar    :  1950  MRN: [id number]  Restrictions/Precautions:    Medical/Treatment Diagnosis Information:  Diagnosis: L07.905 (ICD-10-CM) - Right shoulder pain, unspecified chronicity; S46.001 (ICD-10-CM) - Injury of right rotator cuff  Treatment Diagnosis: Right Shoulder Pain (M25.511); Right Rotator Cuff Tendinopathy (O29.124)  Insurance/Certification information:  PT Insurance Information: MED MUT  Physician Information:  Referring Practitioner: Patient will be able to reach out to the side without the onset of right shoulder pain. Has the plan of care been signed (Y/N):        []  Yes  [x]  No     Date of Patient follow up with Physician: 20      Is this a Progress Report:     []  Yes  [x]  No        If Yes:  Date Range for reporting period:  Beginning: 10/28/20  Ending:    Progress report will be due (10 Rx or 30 days whichever is less):        Recertification will be due (POC Duration  / 90 days whichever is less): 20       Visit # Insurance Allowable Auth Required   2 ??? []  Yes []  No        Functional Scale (QuickDASH):  10/28/20: 15.9% Disability (18/55)       Therapy Diagnosis/Practice Pattern: E     Number of Comorbidities:  []0     [x]1-2    []3+    Latex Allergy:  [x]NO      []YES  Preferred Language for Healthcare:   [x]English       []other:      Pain level:  1/10     SUBJECTIVE:  No new complaints.  No pain with HEP but uncertain whether or not he is performing all of the exercises correctly    OBJECTIVE:     Right Shoulder Abduction:  WNL, 1/10 pain    RESTRICTIONS/PRECAUTIONS: None    Exercises/Interventions:     Therapeutic Ex (17345) Volume Notes/CUES   Circuit (3x):  -Supine Pec Stretch  -Supine [place]   12x  12x    Circuit (2x):  -Serratus Wall SLides  -Low Trap Lift Offs  -TB IR  -Scaption  -TB ER   12x, yellow  12x  12x, red  12x, BW  12x, red                                                      Manual Intervention (23036) 18 min    Grade II-IV posterior and inferior glides of the right GHJ     STM to the right p posterior RTC, teres major, and lat     Grade III upward rotation of the right scapula     Contract-relax stretching of the posterior RTC               NMR re-education (51436)  CUES NEEDED                                                Therapeutic Activity (67044)                                   Additional time was spent explaining exercise instruction, exercise set up, and rest breaks. Patient Education: Continue current HEP. Therapeutic Exercise and NMR EXR  [x] (83670) Provided verbal/tactile cueing for activities related to strengthening, flexibility, endurance, ROM  for improvements in scapular, scapulothoracic and UE control with self care, reaching, carrying, lifting, house/yardwork, driving/computer work.    [] (93589) Provided verbal/tactile cueing for activities related to improving balance, coordination, kinesthetic sense, posture, motor skill, proprioception  to assist with  scapular, scapulothoracic and UE control with self care, reaching, carrying, lifting, house/yardwork, driving/computer work. Therapeutic Activities:    [] (30820 or 52259) Provided verbal/tactile cueing for activities related to improving balance, coordination, kinesthetic sense, posture, motor skill, proprioception and motor activation to allow for proper function of scapular, scapulothoracic and UE control with self care, carrying, lifting, driving/computer work.      Home Exercise Program:    [x] (37855) Reviewed/Progressed HEP activities related to strengthening, flexibility, endurance, ROM of scapular, scapulothoracic and UE control with self care, reaching, carrying, lifting, house/yardwork, driving/computer work  [] (72024) Reviewed/Progressed HEP activities related to improving balance, coordination, kinesthetic sense, posture, motor skill, proprioception of scapular, scapulothoracic and UE control with self care, reaching, carrying, lifting, house/yardwork, driving/computer work      Manual Treatments:  PROM / STM / Oscillations-Mobs:  G-I, II, III, IV (PA's, Inf., Post.)  [x] (65759) Provided manual therapy to mobilize soft tissue/joints of cervical/CT, scapular GHJ and UE for the purpose of modulating pain, promoting relaxation,  increasing ROM, reducing/eliminating soft tissue swelling/inflammation/restriction, improving soft tissue extensibility and allowing for proper ROM for normal function with self care, reaching, carrying, lifting, house/yardwork, driving/computer work    Modalities:      Charges:  Timed Code Treatment Minutes: 43   Total Treatment Minutes: 43     Time In: 1:31pm  Time Out: 2:14pm        [x] EVAL (LOW) 02035 (typically 20 minutes face-to-face)  [] EVAL (MOD) 20615 (typically 30 minutes face-to-face)  [] EVAL (HIGH) 29529 (typically 45 minutes face-to-face)  [] RE-EVAL     [x] CM(22910) x 2    [] IONTO  [] NMR (21074) x     [] VASO  [x] Manual (09008) x 1     [] Other:  [] TA x      [] Mech Traction (52446)  [] ES(attended) (73382)      [] ES (un) (26773):     GOALS:  Patient stated goal: Patient will be able to reach out to his right side without the onset of right shoulder pain. [x] Progressing: [] Met: [] Not Met: [] Adjusted    Therapist goals for Patient:   Short Term Goals: To be achieved in: 2 weeks  1. Independent in HEP and progression per patient tolerance, in order to prevent re-injury. [] Progressing: [x] Met: [] Not Met: [] Adjusted  2. Patient will have a decrease in pain to facilitate improvement in movement, function, and ADLs as indicated by Functional Deficits. [] Progressing: [x] Met: [] Not Met: [] Adjusted    Long Term Goals: To be achieved in: 8 weeks  1.  Disability index score of 7% or less for the [facility] to assist with reaching prior level of function. [x] Progressing: [] Met: [] Not Met: [] Adjusted  2. Patient will demonstrate symmetrical and pain-free arm elevation between the affected and unaffected side. [x] Progressing: [] Met: [] Not Met: [] Adjusted  3. Patient will be able to sleep on his right side without the onset of right shoulder pain. [x] Progressing: [] Met: [] Not Met: [] Adjusted  4. Patient will be able to mimic UE painting motions without the onset of right shoulder pain. [x] Progressing: [] Met: [] Not Met: [] Adjusted  5. Patient will be able to play pickleball without the onset of right shoulder pain. [x] Progressing: [] Met: [] Not Met: [] Adjusted     Progression Towards Functional goals:  [x] Patient is progressing as expected towards functional goals listed. [] Progression is slowed due to complexities listed. [] Progression has been slowed due to co-morbidities. [] Plan just implemented, too soon to assess goals progression  [] Other:     ASSESSMENT:  Patient tolerated treatment well. He would benefit from further skilled PT intervention in order to improve right shoulder girdle mobility, muscular endurance, and strength in order to return to his prior level of function and activity without the onset of right shoulder pain. Overall Progression Towards Functional goals/ Treatment Progress Update:  [x] Patient is progressing as expected towards functional goals listed. [] Progression is slowed due to complexities/Impairments listed. [] Progression has been slowed due to co-morbidities.   [] Plan just implemented, too soon to assess goals progression <30days   [] Goals require adjustment due to lack of progress  [] Patient is not progressing as expected and requires additional follow up with physician  [] Other    Prognosis for POC: [x] Good [] Fair  [] Poor      Patient requires continued skilled intervention: [x] Yes  [] No    Treatment/Activity Tolerance:  [x] Patient able to complete treatment  [] Patient limited by fatigue  [] Patient limited by pain    [] Patient limited by other medical complications  [] Other:         Return to Play: (if applicable)   []  Stage 1: Intro to Strength   []  Stage 2: Return to Run and Strength   []  Stage 3: Return to Jump and Strength   []  Stage 4: Dynamic Strength and Agility   []  Stage 5: Sport Specific Training     []  Ready to Return to Play, Meets All Above Stages   []  Not Ready for Return to Sports   Comments:                               PLAN: 1-2x per week for 8 weeks (beginning 10/28/20, ending 12/23/20)  [x] Continue per plan of care [] Alter current plan (see comments above)  [] Plan of care initiated [] Hold pending MD visit [] Discharge      Electronically signed by:  Lilian Mcfarland PT, DPT ([place] PT License #: [id number])    Note: If patient does not return for scheduled/ recommended follow up visits, this note will serve as a discharge from care along with most recent update on progress.

## 2020-11-02 ENCOUNTER — HOSPITAL ENCOUNTER (OUTPATIENT)
Dept: PHYSICAL THERAPY | Age: 70
Setting detail: THERAPIES SERIES
Discharge: HOME | End: 2020-11-02
Payer: MEDICARE

## 2020-11-02 PROCEDURE — 97110 THERAPEUTIC EXERCISES: CPT

## 2020-11-02 PROCEDURE — 97140 MANUAL THERAPY 1/> REGIONS: CPT

## 2020-11-02 NOTE — FLOWSHEET NOTE
[facility],  [address] [place]  Phone: [phone]  Fax [phone]        Date:  2020    Patient Name:  Jesus Sterling    :  1950  MRN: [id number]  Restrictions/Precautions:    Medical/Treatment Diagnosis Information:  Diagnosis: Q37.020 (ICD-10-CM) - Right shoulder pain, unspecified chronicity; S46.001 (ICD-10-CM) - Injury of right rotator cuff  Treatment Diagnosis: Right Shoulder Pain (M25.511); Right Rotator Cuff Tendinopathy (Y61.185)  Insurance/Certification information:  PT Insurance Information: MED MUT  Physician Information:  Referring Practitioner: Patient will be able to reach out to the side without the onset of right shoulder pain. Has the plan of care been signed (Y/N):        [x]  Yes  []  No     Date of Patient follow up with Physician: 20      Is this a Progress Report:     []  Yes  [x]  No        If Yes:  Date Range for reporting period:  Beginning: 10/28/20  Ending:    Progress report will be due (10 Rx or 30 days whichever is less):        Recertification will be due (POC Duration  / 90 days whichever is less): 20       Visit # Insurance Allowable Auth Required   3 BMN []  Yes [x]  No        Functional Scale (QuickDASH):  10/28/20: 15.9% Disability (18/55)       Therapy Diagnosis/Practice Pattern: E     Number of Comorbidities:  []0     [x]1-2    []3+    Latex Allergy:  [x]NO      []YES  Preferred Language for Healthcare:   [x]English       []other:      Pain level:  Low     SUBJECTIVE:  No new complaints. Patient notes his pain is feeling better overall.     OBJECTIVE:     Right Shoulder Abduction:  WNL, low pain, patient notes better than previously    RESTRICTIONS/PRECAUTIONS: None    Exercises/Interventions:     Therapeutic Ex (74565) Volume Notes/CUES   Circuit (2x):  -Supine Pec Stretch  -Supine [place]   12x  12x    [address] (2x):  -Push/Pull  -Ladder  -Inward Circles  -Outward Circles  -GHJ Flexion + Torso FLexion   12x, 5#  12x, 5#  12x, 5#  12x, 5#  12x on right, 8#    Circuit (2x):  -Serratus Wall SLides  -Low Trap Lift Offs  -TB IR  -Scaption  -TB ER   12x, yellow  12x  12x, red  12x, 2#  12x, red    Circuit (2x):  -SA Staggered Stance TB Row  -SA Staggered Stance TB Chest Press   12x on right, blue  12x, on right, blue                                                 Manual Intervention (63709) 14 min    STM to right subscap     Grade II-IV posterior and inferior glides of the right GHJ     STM to the right posterior RTC, teres major, and lat     Grade III upward rotation of the right scapula                    NMR re-education (35625)  CUES NEEDED                                                Therapeutic Activity (35235)                                   Additional time was spent explaining exercise instruction, exercise set up, and rest breaks. Patient Education: HEP updated; provided new handout. .    Access Code: [email or web address]   URL: [employer]/   Date: 11/02/2020   Prepared by: May Campos     Exercises  Supine Pectoralis Stretch - 12 reps - 2 sets - 1x daily - 7x weekly  Humberto Sers on Foam Roll - 12 reps - 2 sets - 1x daily - 7x weekly                   Scapular Wall Slides - 12 reps - 2 sets - 1x daily - 7x weekly  Low Trap Setting at [address] - 12 reps - 2 sets - 1x daily - 7x weekly  Shoulder Internal Rotation with Resistance - 12 reps - 2 sets - 1x daily - 7x weekly  Scaption with Dumbbells - 12 reps - 2 sets - 1x daily - 7x weekly  Shoulder External Rotation with Anchored Resistance - 12 reps - 2 sets - 1x daily - 7x weekly  Split Stance Chest Press with Resistance - 12 reps - 3 sets - 1x daily - 7x weekly  Staggered Stance Single Arm Row with Anchored Resistance - 12 reps - 3 sets - 1x daily - 7x weekly    Therapeutic Exercise and NMR EXR  [x] (99406) Provided verbal/tactile cueing for activities related to strengthening, flexibility, endurance, ROM  for improvements in scapular, scapulothoracic and UE control with self care, reaching, carrying, lifting, house/yardwork, driving/computer work.    [] (68746) Provided verbal/tactile cueing for activities related to improving balance, coordination, kinesthetic sense, posture, motor skill, proprioception  to assist with  scapular, scapulothoracic and UE control with self care, reaching, carrying, lifting, house/yardwork, driving/computer work. Therapeutic Activities:    [] (14868 or 20185) Provided verbal/tactile cueing for activities related to improving balance, coordination, kinesthetic sense, posture, motor skill, proprioception and motor activation to allow for proper function of scapular, scapulothoracic and UE control with self care, carrying, lifting, driving/computer work.      Home Exercise Program:    [x] (16935) Reviewed/Progressed HEP activities related to strengthening, flexibility, endurance, ROM of scapular, scapulothoracic and UE control with self care, reaching, carrying, lifting, house/yardwork, driving/computer work  [] (83766) Reviewed/Progressed HEP activities related to improving balance, coordination, kinesthetic sense, posture, motor skill, proprioception of scapular, scapulothoracic and UE control with self care, reaching, carrying, lifting, house/yardwork, driving/computer work      Manual Treatments:  PROM / STM / Oscillations-Mobs:  G-I, II, III, IV (PA's, Inf., Post.)  [x] (59636) Provided manual therapy to mobilize soft tissue/joints of cervical/CT, scapular GHJ and UE for the purpose of modulating pain, promoting relaxation,  increasing ROM, reducing/eliminating soft tissue swelling/inflammation/restriction, improving soft tissue extensibility and allowing for proper ROM for normal function with self care, reaching, carrying, lifting, house/yardwork, driving/computer work    Modalities:      Charges:  Timed Code Treatment Minutes: 40 Total Treatment Minutes: 40     Time In: 2:17pm  Time Out: 2:57pm        [] EVAL (LOW) 90468 (typically 20 minutes face-to-face)  [] EVAL (MOD) 45905 (typically 30 minutes face-to-face)  [] EVAL (HIGH) 55478 (typically 45 minutes face-to-face)  [] RE-EVAL     [x] XH(35801) x 2    [] IONTO  [] NMR (20123) x     [] VASO  [x] Manual (60829) x 1     [] Other:  [] TA x      [] Mech Traction (53889)  [] ES(attended) (46142)      [] ES (un) (35590):     GOALS:  Patient stated goal: Patient will be able to reach out to his right side without the onset of right shoulder pain. [x] Progressing: [] Met: [] Not Met: [] Adjusted    Therapist goals for Patient:   Short Term Goals: To be achieved in: 2 weeks  1. Independent in HEP and progression per patient tolerance, in order to prevent re-injury. [] Progressing: [x] Met: [] Not Met: [] Adjusted  2. Patient will have a decrease in pain to facilitate improvement in movement, function, and ADLs as indicated by Functional Deficits. [] Progressing: [x] Met: [] Not Met: [] Adjusted    Long Term Goals: To be achieved in: 8 weeks  1. Disability index score of 7% or less for the [facility] to assist with reaching prior level of function. [x] Progressing: [] Met: [] Not Met: [] Adjusted  2. Patient will demonstrate symmetrical and pain-free arm elevation between the affected and unaffected side. [x] Progressing: [] Met: [] Not Met: [] Adjusted  3. Patient will be able to sleep on his right side without the onset of right shoulder pain. [x] Progressing: [] Met: [] Not Met: [] Adjusted  4. Patient will be able to mimic UE painting motions without the onset of right shoulder pain. [x] Progressing: [] Met: [] Not Met: [] Adjusted  5. Patient will be able to play pickleball without the onset of right shoulder pain. [x] Progressing: [] Met: [] Not Met: [] Adjusted     Progression Towards Functional goals:  [x] Patient is progressing as expected towards functional goals listed. [] Progression is slowed due to complexities listed. [] Progression has been slowed due to co-morbidities. [] Plan just implemented, too soon to assess goals progression  [] Other:     ASSESSMENT:  Patient tolerated treatment well. Therapeutic exercise was progressed without adverse effect. He would benefit from further skilled PT intervention in order to improve right shoulder girdle mobility, muscular endurance, and strength in order to return to his prior level of function and activity without the onset of right shoulder pain. Overall Progression Towards Functional goals/ Treatment Progress Update:  [x] Patient is progressing as expected towards functional goals listed. [] Progression is slowed due to complexities/Impairments listed. [] Progression has been slowed due to co-morbidities.   [] Plan just implemented, too soon to assess goals progression <30days   [] Goals require adjustment due to lack of progress  [] Patient is not progressing as expected and requires additional follow up with physician  [] Other    Prognosis for POC: [x] Good [] Fair  [] Poor      Patient requires continued skilled intervention: [x] Yes  [] No    Treatment/Activity Tolerance:  [x] Patient able to complete treatment  [] Patient limited by fatigue  [] Patient limited by pain    [] Patient limited by other medical complications  [] Other:         Return to Play: (if applicable)   []  Stage 1: Intro to Strength   []  Stage 2: Return to Run and Strength   []  Stage 3: Return to Jump and Strength   []  Stage 4: Dynamic Strength and Agility   []  Stage 5: Sport Specific Training     []  Ready to Return to Play, Meets All Above Stages   []  Not Ready for Return to Sports   Comments:                               PLAN: 1-2x per week for 8 weeks (beginning 10/28/20, ending 12/23/20)  [x] Continue per plan of care [] Alter current plan (see comments above)  [] Plan of care initiated [] Hold pending MD visit [] Discharge      Electronically signed by:  Tahmina Camacho, PT, DPT ([place] PT License #: [id number])    Note: If patient does not return for scheduled/ recommended follow up visits, this note will serve as a discharge from care along with most recent update on progress.

## 2020-11-06 ENCOUNTER — HOSPITAL ENCOUNTER (OUTPATIENT)
Dept: PHYSICAL THERAPY | Age: 70
Setting detail: THERAPIES SERIES
Discharge: HOME | End: 2020-11-06
Payer: MEDICARE

## 2020-11-06 PROCEDURE — 97110 THERAPEUTIC EXERCISES: CPT

## 2020-11-06 PROCEDURE — 97140 MANUAL THERAPY 1/> REGIONS: CPT

## 2020-11-06 NOTE — FLOWSHEET NOTE
[facility],  [address] [place]  Phone: [phone]  Fax [phone]        Date:  2020    Patient Name:  Rah Grullon    :  1950  MRN: [id number]  Restrictions/Precautions:    Medical/Treatment Diagnosis Information:  Diagnosis: U83.396 (ICD-10-CM) - Right shoulder pain, unspecified chronicity; S46.001 (ICD-10-CM) - Injury of right rotator cuff  Treatment Diagnosis: Right Shoulder Pain (M25.511); Right Rotator Cuff Tendinopathy (O16.629)  Insurance/Certification information:  PT Insurance Information: MED MUT  Physician Information:  Referring Practitioner: Patient will be able to reach out to the side without the onset of right shoulder pain. Has the plan of care been signed (Y/N):        [x]  Yes  []  No     Date of Patient follow up with Physician: 20      Is this a Progress Report:     []  Yes  [x]  No        If Yes:  Date Range for reporting period:  Beginning: 10/28/20  Ending:    Progress report will be due (10 Rx or 30 days whichever is less):        Recertification will be due (POC Duration  / 90 days whichever is less): 20       Visit # Insurance Allowable Auth Required   4 BMN []  Yes [x]  No        Functional Scale (QuickDASH):  10/28/20: 15.9% Disability (18/55)       Therapy Diagnosis/Practice Pattern: E     Number of Comorbidities:  []0     [x]1-2    []3+    Latex Allergy:  [x]NO      []YES  Preferred Language for Healthcare:   [x]English       []other:      Pain level:  1/10     SUBJECTIVE:  No new complaints. He notes his shoudler continues to feel better and better. OBJECTIVE:     Right Shoulder Abduction:  WNL and symmetrical. Patient notes a sensation at his right shoulder but describes it as not being pain, but and injury that is still recovering.     RESTRICTIONS/PRECAUTIONS: None    Exercises/Interventions:     Therapeutic Ex (52139) Volume Notes/CUES   Circuit 1x daily - 7x weekly    Therapeutic Exercise and NMR EXR  [x] (36923) Provided verbal/tactile cueing for activities related to strengthening, flexibility, endurance, ROM  for improvements in scapular, scapulothoracic and UE control with self care, reaching, carrying, lifting, house/yardwork, driving/computer work.    [] (39514) Provided verbal/tactile cueing for activities related to improving balance, coordination, kinesthetic sense, posture, motor skill, proprioception  to assist with  scapular, scapulothoracic and UE control with self care, reaching, carrying, lifting, house/yardwork, driving/computer work. Therapeutic Activities:    [] (08652 or 61480) Provided verbal/tactile cueing for activities related to improving balance, coordination, kinesthetic sense, posture, motor skill, proprioception and motor activation to allow for proper function of scapular, scapulothoracic and UE control with self care, carrying, lifting, driving/computer work.      Home Exercise Program:    [x] (76182) Reviewed/Progressed HEP activities related to strengthening, flexibility, endurance, ROM of scapular, scapulothoracic and UE control with self care, reaching, carrying, lifting, house/yardwork, driving/computer work  [] (46324) Reviewed/Progressed HEP activities related to improving balance, coordination, kinesthetic sense, posture, motor skill, proprioception of scapular, scapulothoracic and UE control with self care, reaching, carrying, lifting, house/yardwork, driving/computer work      Manual Treatments:  PROM / STM / Oscillations-Mobs:  G-I, II, III, IV (PA's, Inf., Post.)  [x] (15754) Provided manual therapy to mobilize soft tissue/joints of cervical/CT, scapular GHJ and UE for the purpose of modulating pain, promoting relaxation,  increasing ROM, reducing/eliminating soft tissue swelling/inflammation/restriction, improving soft tissue extensibility and allowing for proper ROM for normal function with self care, reaching, carrying, lifting, house/yardwork, driving/computer work    Modalities:      Charges:  Timed Code Treatment Minutes: 44   Total Treatment Minutes: 44     Time In: 1:33pm  Time Out: 2:17pm        [] EVAL (LOW) 67914 (typically 20 minutes face-to-face)  [] EVAL (MOD) 84367 (typically 30 minutes face-to-face)  [] EVAL (HIGH) 10538 (typically 45 minutes face-to-face)  [] RE-EVAL     [x] YX(79540) x 2    [] IONTO  [] NMR (54245) x     [] VASO  [x] Manual (42486) x 1     [] Other:  [] TA x      [] Mech Traction (67764)  [] ES(attended) (59630)      [] ES (un) (26878):     GOALS:  Patient stated goal: Patient will be able to reach out to his right side without the onset of right shoulder pain. [x] Progressing: [] Met: [] Not Met: [] Adjusted    Therapist goals for Patient:   Short Term Goals: To be achieved in: 2 weeks  1. Independent in HEP and progression per patient tolerance, in order to prevent re-injury. [] Progressing: [x] Met: [] Not Met: [] Adjusted  2. Patient will have a decrease in pain to facilitate improvement in movement, function, and ADLs as indicated by Functional Deficits. [] Progressing: [x] Met: [] Not Met: [] Adjusted    Long Term Goals: To be achieved in: 8 weeks  1. Disability index score of 7% or less for the [facility] to assist with reaching prior level of function. [x] Progressing: [] Met: [] Not Met: [] Adjusted  2. Patient will demonstrate symmetrical and pain-free arm elevation between the affected and unaffected side. [x] Progressing: [] Met: [] Not Met: [] Adjusted  3. Patient will be able to sleep on his right side without the onset of right shoulder pain. [x] Progressing: [] Met: [] Not Met: [] Adjusted  4. Patient will be able to mimic UE painting motions without the onset of right shoulder pain. [x] Progressing: [] Met: [] Not Met: [] Adjusted  5. Patient will be able to play pickleball without the onset of right shoulder pain.    [x] Progressing: [] Met: [] Not Met: [] Continue per plan of care [] Alter current plan (see comments above)  [] Plan of care initiated [] Hold pending MD visit [] Discharge      Electronically signed by:  Ziggy Case PT, DPT ([place] PT License #: [id number])    Note: If patient does not return for scheduled/ recommended follow up visits, this note will serve as a discharge from care along with most recent update on progress.

## 2020-11-09 ENCOUNTER — HOSPITAL ENCOUNTER (OUTPATIENT)
Dept: PHYSICAL THERAPY | Age: 70
Setting detail: THERAPIES SERIES
Discharge: HOME | End: 2020-11-09
Payer: MEDICARE

## 2020-11-09 PROCEDURE — 97110 THERAPEUTIC EXERCISES: CPT

## 2020-11-09 PROCEDURE — 97140 MANUAL THERAPY 1/> REGIONS: CPT

## 2020-11-09 NOTE — FLOWSHEET NOTE
[facility], [address]  Phone: [phone]  Fax [phone]        Date:  2020    Patient Name:  Henrietta Curling    :  1950  MRN: [id number]  Restrictions/Precautions:    Medical/Treatment Diagnosis Information:  Diagnosis: Z88.117 (ICD-10-CM) - Right shoulder pain, unspecified chronicity; S46.001 (ICD-10-CM) - Injury of right rotator cuff  Treatment Diagnosis: Right Shoulder Pain (M25.511); Right Rotator Cuff Tendinopathy (D42.213)  Insurance/Certification information:  PT Insurance Information: MED MUT  Physician Information:  Referring Practitioner: Patient will be able to reach out to the side without the onset of right shoulder pain. Has the plan of care been signed (Y/N):        [x]  Yes  []  No     Date of Patient follow up with Physician: 20      Is this a Progress Report:     []  Yes  [x]  No        If Yes:  Date Range for reporting period:  Beginning: 10/28/20  Ending:    Progress report will be due (10 Rx or 30 days whichever is less):        Recertification will be due (POC Duration  / 90 days whichever is less): 20       Visit # Insurance Allowable Auth Required   5 BMN []  Yes [x]  No        Functional Scale (QuickDASH):  10/28/20: 15.9% Disability (18/55)       Therapy Diagnosis/Practice Pattern: E     Number of Comorbidities:  []0     [x]1-2    []3+    Latex Allergy:  [x]NO      []YES  Preferred Language for Healthcare:   [x]English       []other:      Pain level:  None reported     SUBJECTIVE:  No new complaints. OBJECTIVE:   · Active Shoulder Flexion:  WNL and symmetrical with no pain. · Active Shoulder Abduction:  WNL and symmetrical with no pain.       RESTRICTIONS/PRECAUTIONS: None    Exercises/Interventions:     Therapeutic Ex (93959) Volume Notes/CUES   Circuit (2x):  -Supine Pec Stretch  -Supine [place]   12x  12x    [address] (2x):  -Push/Pull  -Ladder  -Inward Circles  -Outward Circles  -GHJ Flexion + Torso FLexion   12x, 6#  12x, 6#  12x, 6#  12x, 6#  12x on right, 10#    Circuit (3x):  -SA Trampoline Chest Pass  -Overhead Trampoine Toss   12x on right, 4#  12x, 2#    Circuit (2x):  -Serratus Wall SLides  -Low Trap Lift Offs  -TB IR  -Scaption  -TB ER   12x, yellow  12x  12x, green  12x, 3#  12x, red                                                      Manual Intervention (30687) 15 min    STM to right subscap     Grade II-IV posterior and inferior glides of the right GHJ     STM to the right posterior RTC, teres major, and lat     Grade III upward rotation of the right scapula     Serratus MET               NMR re-education (78003)  CUES NEEDED                                                Therapeutic Activity (62996)                                   Additional time was spent explaining exercise instruction, exercise set up, and rest breaks. Patient Education: Continue current HEP. Access Code: [email or web address]   URL: [email or web address]   Date: 11/02/2020   Prepared by: Yarelis Denny     Exercises  Supine Pectoralis Stretch - 12 reps - 2 sets - 1x daily - 7x weekly  Dennis Liborio on Foam Roll - 12 reps - 2 sets - 1x daily - 7x weekly                   Scapular Wall Slides - 12 reps - 2 sets - 1x daily - 7x weekly  Low Trap Setting at [address] - 12 reps - 2 sets - 1x daily - 7x weekly  Shoulder Internal Rotation with Resistance - 12 reps - 2 sets - 1x daily - 7x weekly  Scaption with Dumbbells - 12 reps - 2 sets - 1x daily - 7x weekly  Shoulder External Rotation with Anchored Resistance - 12 reps - 2 sets - 1x daily - 7x weekly  Split Stance Chest Press with Resistance - 12 reps - 3 sets - 1x daily - 7x weekly  Staggered Stance Single Arm Row with Anchored Resistance - 12 reps - 3 sets - 1x daily - 7x weekly    Therapeutic Exercise and NMR EXR  [x] (19252) Provided verbal/tactile cueing for activities related to strengthening, flexibility, endurance, ROM  for improvements in scapular, scapulothoracic and UE control with self care, reaching, carrying, lifting, house/yardwork, driving/computer work.    [] (68112) Provided verbal/tactile cueing for activities related to improving balance, coordination, kinesthetic sense, posture, motor skill, proprioception  to assist with  scapular, scapulothoracic and UE control with self care, reaching, carrying, lifting, house/yardwork, driving/computer work. Therapeutic Activities:    [] (43325 or 26842) Provided verbal/tactile cueing for activities related to improving balance, coordination, kinesthetic sense, posture, motor skill, proprioception and motor activation to allow for proper function of scapular, scapulothoracic and UE control with self care, carrying, lifting, driving/computer work.      Home Exercise Program:    [x] (87193) Reviewed/Progressed HEP activities related to strengthening, flexibility, endurance, ROM of scapular, scapulothoracic and UE control with self care, reaching, carrying, lifting, house/yardwork, driving/computer work  [] (78681) Reviewed/Progressed HEP activities related to improving balance, coordination, kinesthetic sense, posture, motor skill, proprioception of scapular, scapulothoracic and UE control with self care, reaching, carrying, lifting, house/yardwork, driving/computer work      Manual Treatments:  PROM / STM / Oscillations-Mobs:  G-I, II, III, IV (PA's, Inf., Post.)  [x] (77319) Provided manual therapy to mobilize soft tissue/joints of cervical/CT, scapular GHJ and UE for the purpose of modulating pain, promoting relaxation,  increasing ROM, reducing/eliminating soft tissue swelling/inflammation/restriction, improving soft tissue extensibility and allowing for proper ROM for normal function with self care, reaching, carrying, lifting, house/yardwork, driving/computer work    Modalities:      Charges:  Timed Code Treatment Minutes: 41   Total Treatment Minutes: 41     Time In: 2:18pm  Time Out: 2:59pm        [] EVAL (LOW) 14085 (typically 20 minutes face-to-face)  [] EVAL (MOD) 36017 (typically 30 minutes face-to-face)  [] EVAL (HIGH) 79854 (typically 45 minutes face-to-face)  [] RE-EVAL     [x] OW(30872) x 2    [] IONTO  [] NMR (60172) x     [] VASO  [x] Manual (66505) x 1     [] Other:  [] TA x      [] Mech Traction (70576)  [] ES(attended) (09461)      [] ES (un) (97913):     GOALS:  Patient stated goal: Patient will be able to reach out to his right side without the onset of right shoulder pain. [x] Progressing: [] Met: [] Not Met: [] Adjusted    Therapist goals for Patient:   Short Term Goals: To be achieved in: 2 weeks  1. Independent in HEP and progression per patient tolerance, in order to prevent re-injury. [] Progressing: [x] Met: [] Not Met: [] Adjusted  2. Patient will have a decrease in pain to facilitate improvement in movement, function, and ADLs as indicated by Functional Deficits. [] Progressing: [x] Met: [] Not Met: [] Adjusted    Long Term Goals: To be achieved in: 8 weeks  1. Disability index score of 7% or less for the [facility] to assist with reaching prior level of function. [x] Progressing: [] Met: [] Not Met: [] Adjusted  2. Patient will demonstrate symmetrical and pain-free arm elevation between the affected and unaffected side. [x] Progressing: [] Met: [] Not Met: [] Adjusted  3. Patient will be able to sleep on his right side without the onset of right shoulder pain. [x] Progressing: [] Met: [] Not Met: [] Adjusted  4. Patient will be able to mimic UE painting motions without the onset of right shoulder pain. [x] Progressing: [] Met: [] Not Met: [] Adjusted  5. Patient will be able to play pickleball without the onset of right shoulder pain. [x] Progressing: [] Met: [] Not Met: [] Adjusted     Progression Towards Functional goals:  [x] Patient is progressing as expected towards functional goals listed.     [] Progression is slowed due to complexities listed. [] Progression has been slowed due to co-morbidities. [] Plan just implemented, too soon to assess goals progression  [] Other:     ASSESSMENT:  Patient tolerated treatment well. He is making good progress in PT. Volume of low-level plyometric exercise wasincreased today without adverse effect. DB Scaption was also progressed without adverse effect. At this point, PT is amenable to having him attempt to play [employer] ball this coming Friday, 11/13/20. He would benefit from further skilled PT intervention in order to improve right shoulder girdle mobility, muscular endurance, and strength in order to return to his prior level of function and activity without the onset of right shoulder pain. Overall Progression Towards Functional goals/ Treatment Progress Update:  [x] Patient is progressing as expected towards functional goals listed. [] Progression is slowed due to complexities/Impairments listed. [] Progression has been slowed due to co-morbidities.   [] Plan just implemented, too soon to assess goals progression <30days   [] Goals require adjustment due to lack of progress  [] Patient is not progressing as expected and requires additional follow up with physician  [] Other    Prognosis for POC: [x] Good [] Fair  [] Poor      Patient requires continued skilled intervention: [x] Yes  [] No    Treatment/Activity Tolerance:  [x] Patient able to complete treatment  [] Patient limited by fatigue  [] Patient limited by pain    [] Patient limited by other medical complications  [] Other:         Return to Play: (if applicable)   []  Stage 1: Intro to Strength   []  Stage 2: Return to Run and Strength   []  Stage 3: Return to Jump and Strength   []  Stage 4: Dynamic Strength and Agility   []  Stage 5: Sport Specific Training     []  Ready to Return to Play, Meets All Above Stages   []  Not Ready for Return to Sports   Comments:                               PLAN: 1-2x per week for 8 weeks (beginning 10/28/20, ending 12/23/20)  [x] Continue per plan of care [] Alter current plan (see comments above)  [] Plan of care initiated [] Hold pending MD visit [] Discharge      Electronically signed by:  Ysabel Escalante PT, DPT ([place] PT License #: [id number])    Note: If patient does not return for scheduled/ recommended follow up visits, this note will serve as a discharge from care along with most recent update on progress.

## 2020-11-12 ENCOUNTER — HOSPITAL ENCOUNTER (OUTPATIENT)
Dept: PHYSICAL THERAPY | Age: 70
Setting detail: THERAPIES SERIES
Discharge: HOME | End: 2020-11-12
Payer: MEDICARE

## 2020-11-12 PROCEDURE — 97140 MANUAL THERAPY 1/> REGIONS: CPT

## 2020-11-12 PROCEDURE — 97110 THERAPEUTIC EXERCISES: CPT

## 2020-11-12 NOTE — FLOWSHEET NOTE
[facility],  [address] [place]  Phone: [phone]  Fax [phone]        Date:  2020    Patient Name:  Kemi Ponce YOB: 1950  MRN: [id number]  Restrictions/Precautions:    Medical/Treatment Diagnosis Information:  Diagnosis: X10.164 (ICD-10-CM) - Right shoulder pain, unspecified chronicity; S46.001 (ICD-10-CM) - Injury of right rotator cuff  Treatment Diagnosis: Right Shoulder Pain (M25.511); Right Rotator Cuff Tendinopathy (N19.024)  Insurance/Certification information:  PT Insurance Information: MED MUT  Physician Information:  Referring Practitioner: Anna Marie Magallon MD  Has the plan of care been signed (Y/N):        [x]  Yes  []  No     Date of Patient follow up with Physician: 20      Is this a Progress Report:     []  Yes  [x]  No        If Yes:  Date Range for reporting period:  Beginning: 10/28/20  Ending:    Progress report will be due (10 Rx or 30 days whichever is less):        Recertification will be due (POC Duration  / 90 days whichever is less): 20       Visit # Insurance Allowable Auth Required   6 BMN []  Yes [x]  No        Functional Scale (QuickDASH):  10/28/20: 15.9% Disability (18/55)       Therapy Diagnosis/Practice Pattern: E     Number of Comorbidities:  []0     [x]1-2    []3+    Latex Allergy:  [x]NO      []YES  Preferred Language for Healthcare:   [x]English       []other:      Pain level:  None reported     SUBJECTIVE:  No new complaints. OBJECTIVE:   · Active Shoulder Flexion:  WNL and symmetrical with no pain. · Active Shoulder Abduction:  WNL and symmetrical with no pain.       RESTRICTIONS/PRECAUTIONS: None    Exercises/Interventions:     Therapeutic Ex (34552) Volume Notes/CUES   Finisher Circuit (2x):  -Push/Pull  -Ladder  -Inward Circles  -Outward Circles  -GHJ Flexion + Torso FLexion   12x, 6#  12x, 6#  12x, 6#  12x, 6#  12x on right, 10#    Circuit (3x):  -SA Trampoline Chest Pass  -Overhead Trampoine Toss   12x on right, 4#  12x, 2#    Circuit (2x):  -Serratus Wall SLides  -Low Trap Lift Offs  -TB IR  -Scaption  -TB ER   12x, yellow  12x  12x, green  12x, 3#  12x, red    Circuit (2x):  -SA Staggered Stance [employer]  -SA Staggered Stance Chest Press   12x, 15#  12x, 15#                                                 Manual Intervention (54976) 12 min    STM to right subscap     Grade II-IV posterior and inferior glides of the right GHJ     STM to the right posterior RTC, teres major, and lat     Grade III upward rotation of the right scapula     Serratus MET               NMR re-education (09861) 5 min CUES NEEDED   Body Blade Midline IR/ER 3x15 on right    Body Blade 90deg 3x15 on right    Body Blade 90/90 scaption 3x15 on right Difficulty maintaining 90deg of ER. Therapeutic Activity (93024)                                   Additional time was spent explaining exercise instruction, exercise set up, and rest breaks. Patient Education:   · Continue current HEP. · Patient can attempt to play pickleball tomorrow. Access Code: [email or web address]   URL: [email or web address]   Date: 11/02/2020   Prepared by: Tony Connell     Exercises  Supine Pectoralis Stretch - 12 reps - 2 sets - 1x daily - 7x weekly  Fernando Fuss on Foam Roll - 12 reps - 2 sets - 1x daily - 7x weekly                   Scapular Wall Slides - 12 reps - 2 sets - 1x daily - 7x weekly  Low Trap Setting at [place] - 12 reps - 2 sets - 1x daily - 7x weekly  Shoulder Internal Rotation with Resistance - 12 reps - 2 sets - 1x daily - 7x weekly  Scaption with Dumbbells - 12 reps - 2 sets - 1x daily - 7x weekly  Shoulder External Rotation with Anchored Resistance - 12 reps - 2 sets - 1x daily - 7x weekly  Split Stance Chest Press with Resistance - 12 reps - 3 sets - 1x daily - 7x weekly  Staggered Stance Single Arm Row with Anchored Resistance - 12 reps - 3 sets - 1x daily - 7x weekly    Therapeutic Exercise and NMR EXR  [x] (54426) Provided verbal/tactile cueing for activities related to strengthening, flexibility, endurance, ROM  for improvements in scapular, scapulothoracic and UE control with self care, reaching, carrying, lifting, house/yardwork, driving/computer work.    [] (55398) Provided verbal/tactile cueing for activities related to improving balance, coordination, kinesthetic sense, posture, motor skill, proprioception  to assist with  scapular, scapulothoracic and UE control with self care, reaching, carrying, lifting, house/yardwork, driving/computer work. Therapeutic Activities:    [] (72586 or 73893) Provided verbal/tactile cueing for activities related to improving balance, coordination, kinesthetic sense, posture, motor skill, proprioception and motor activation to allow for proper function of scapular, scapulothoracic and UE control with self care, carrying, lifting, driving/computer work.      Home Exercise Program:    [x] (24162) Reviewed/Progressed HEP activities related to strengthening, flexibility, endurance, ROM of scapular, scapulothoracic and UE control with self care, reaching, carrying, lifting, house/yardwork, driving/computer work  [] (49132) Reviewed/Progressed HEP activities related to improving balance, coordination, kinesthetic sense, posture, motor skill, proprioception of scapular, scapulothoracic and UE control with self care, reaching, carrying, lifting, house/yardwork, driving/computer work      Manual Treatments:  PROM / STM / Oscillations-Mobs:  G-I, II, III, IV (PA's, Inf., Post.)  [x] (83065) Provided manual therapy to mobilize soft tissue/joints of cervical/CT, scapular GHJ and UE for the purpose of modulating pain, promoting relaxation,  increasing ROM, reducing/eliminating soft tissue swelling/inflammation/restriction, improving soft tissue extensibility and allowing for proper ROM for normal function with self care, reaching, carrying, lifting, house/yardwork, driving/computer work    Modalities:      Charges:  Timed Code Treatment Minutes: 44   Total Treatment Minutes: 44     Time In: 2:21pm  Time Out: 3:05pm        [] EVAL (LOW) 45022 (typically 20 minutes face-to-face)  [] EVAL (MOD) 90674 (typically 30 minutes face-to-face)  [] EVAL (HIGH) 05941 (typically 45 minutes face-to-face)  [] RE-EVAL     [x] CH(35629) x 2    [] IONTO  [x] NMR (65398) x 0    [] VASO  [x] Manual (37466) x 1     [] Other:  [] TA x      [] Mech Traction (73273)  [] ES(attended) (45368)      [] ES (un) (51255):     GOALS:  Patient stated goal: Patient will be able to reach out to his right side without the onset of right shoulder pain. [x] Progressing: [] Met: [] Not Met: [] Adjusted    Therapist goals for Patient:   Short Term Goals: To be achieved in: 2 weeks  1. Independent in HEP and progression per patient tolerance, in order to prevent re-injury. [] Progressing: [x] Met: [] Not Met: [] Adjusted  2. Patient will have a decrease in pain to facilitate improvement in movement, function, and ADLs as indicated by Functional Deficits. [] Progressing: [x] Met: [] Not Met: [] Adjusted    Long Term Goals: To be achieved in: 8 weeks  1. Disability index score of 7% or less for the [facility] to assist with reaching prior level of function. [x] Progressing: [] Met: [] Not Met: [] Adjusted  2. Patient will demonstrate symmetrical and pain-free arm elevation between the affected and unaffected side. [] Progressing: [x] Met: [] Not Met: [] Adjusted  3. Patient will be able to sleep on his right side without the onset of right shoulder pain. [x] Progressing: [] Met: [] Not Met: [] Adjusted  4. Patient will be able to mimic UE painting motions without the onset of right shoulder pain. [] Progressing: [x] Met: [] Not Met: [] Adjusted  5. Patient will be able to play pickleball without the onset of right shoulder pain.    [x] Progressing: [] Met: [] Not Met: [] Adjusted     Progression Towards Functional goals:  [x] Patient is progressing as expected towards functional goals listed. [] Progression is slowed due to complexities listed. [] Progression has been slowed due to co-morbidities. [] Plan just implemented, too soon to assess goals progression  [] Other:     ASSESSMENT:  Patient tolerated treatment well. He is making good progress in PT. PT has instructed him to attempt to play pickleball tomorrow. He would benefit from further skilled PT intervention in order to improve right shoulder girdle mobility, muscular endurance, and strength in order to return to his prior level of function and activity without the onset of right shoulder pain. Overall Progression Towards Functional goals/ Treatment Progress Update:  [x] Patient is progressing as expected towards functional goals listed. [] Progression is slowed due to complexities/Impairments listed. [] Progression has been slowed due to co-morbidities.   [] Plan just implemented, too soon to assess goals progression <30days   [] Goals require adjustment due to lack of progress  [] Patient is not progressing as expected and requires additional follow up with physician  [] Other    Prognosis for POC: [x] Good [] Fair  [] Poor      Patient requires continued skilled intervention: [x] Yes  [] No    Treatment/Activity Tolerance:  [x] Patient able to complete treatment  [] Patient limited by fatigue  [] Patient limited by pain    [] Patient limited by other medical complications  [] Other:         Return to Play: (if applicable)   []  Stage 1: Intro to Strength   []  Stage 2: Return to Run and Strength   []  Stage 3: Return to Jump and Strength   []  Stage 4: Dynamic Strength and Agility   []  Stage 5: Sport Specific Training     []  Ready to Return to Play, Meets All Above Stages   []  Not Ready for Return to Sports   Comments:                               PLAN: 1-2x per week for 8 weeks (beginning 10/28/20, ending 12/23/20)  [x] Continue per plan of care [] Alter current plan (see comments above)  [] Plan of care initiated [] Hold pending MD visit [] Discharge      Electronically signed by:  Lilian Mcfarland PT, DPT ([place] PT License #: [id number])    Note: If patient does not return for scheduled/ recommended follow up visits, this note will serve as a discharge from care along with most recent update on progress.

## 2020-11-16 ENCOUNTER — HOSPITAL ENCOUNTER (OUTPATIENT)
Dept: PHYSICAL THERAPY | Age: 70
Setting detail: THERAPIES SERIES
Discharge: HOME | End: 2020-11-16
Payer: MEDICARE

## 2020-11-16 PROCEDURE — 97110 THERAPEUTIC EXERCISES: CPT

## 2020-11-16 PROCEDURE — 97140 MANUAL THERAPY 1/> REGIONS: CPT

## 2020-11-16 NOTE — FLOWSHEET NOTE
[facility],  [address] [place]  Phone: [phone]  Fax [phone]        Date:  2020    Patient Name:  Xavier Wong    :  1950  MRN: [id number]  Restrictions/Precautions:    Medical/Treatment Diagnosis Information:  Diagnosis: E50.860 (ICD-10-CM) - Right shoulder pain, unspecified chronicity; S46.001 (ICD-10-CM) - Injury of right rotator cuff  Treatment Diagnosis: Right Shoulder Pain (M25.511); Right Rotator Cuff Tendinopathy (H31.714)  Insurance/Certification information:  PT Insurance Information: MED MUT  Physician Information:  Referring Practitioner: Óscar Armendariz MD  Has the plan of care been signed (Y/N):        [x]  Yes  []  No     Date of Patient follow up with Physician: 20      Is this a Progress Report:     []  Yes  [x]  No        If Yes:  Date Range for reporting period:  Beginning: 10/28/20  Ending:    Progress report will be due (10 Rx or 30 days whichever is less):        Recertification will be due (POC Duration  / 90 days whichever is less): 20       Visit # Insurance Allowable Auth Required   7 BMN []  Yes [x]  No        Functional Scale (QuickDASH):  10/28/20: 15.9% Disability (18/55)       Therapy Diagnosis/Practice Pattern: E     Number of Comorbidities:  []0     [x]1-2    []3+    Latex Allergy:  [x]NO      []YES  Preferred Language for Healthcare:   [x]English       []other:      Pain level:  1-2/10     SUBJECTIVE:  Patient note mild soreness after playing pickleball the end of last week. Mild residual soreness today. OBJECTIVE:   · Mild hypomobility of posterior glide at right GHJ.       RESTRICTIONS/PRECAUTIONS: None    Exercises/Interventions:     Therapeutic Ex (65154) Volume Notes/CUES   Finisher Circuit (2x):  -Push/Pull  -Ladder  -Inward Circles  -Outward Circles  -GHJ Flexion + Torso FLexion   12x, 6#  12x, 6#  12x, 6#  12x, 6#  12x on right, 10# with Anchored Resistance - 12 reps - 3 sets - 1x daily - 7x weekly    Therapeutic Exercise and NMR EXR  [x] (43929) Provided verbal/tactile cueing for activities related to strengthening, flexibility, endurance, ROM  for improvements in scapular, scapulothoracic and UE control with self care, reaching, carrying, lifting, house/yardwork, driving/computer work.    [] (64813) Provided verbal/tactile cueing for activities related to improving balance, coordination, kinesthetic sense, posture, motor skill, proprioception  to assist with  scapular, scapulothoracic and UE control with self care, reaching, carrying, lifting, house/yardwork, driving/computer work. Therapeutic Activities:    [] (37432 or 71835) Provided verbal/tactile cueing for activities related to improving balance, coordination, kinesthetic sense, posture, motor skill, proprioception and motor activation to allow for proper function of scapular, scapulothoracic and UE control with self care, carrying, lifting, driving/computer work.      Home Exercise Program:    [x] (99038) Reviewed/Progressed HEP activities related to strengthening, flexibility, endurance, ROM of scapular, scapulothoracic and UE control with self care, reaching, carrying, lifting, house/yardwork, driving/computer work  [] (75748) Reviewed/Progressed HEP activities related to improving balance, coordination, kinesthetic sense, posture, motor skill, proprioception of scapular, scapulothoracic and UE control with self care, reaching, carrying, lifting, house/yardwork, driving/computer work      Manual Treatments:  PROM / STM / Oscillations-Mobs:  G-I, II, III, IV (PA's, Inf., Post.)  [x] (96404) Provided manual therapy to mobilize soft tissue/joints of cervical/CT, scapular GHJ and UE for the purpose of modulating pain, promoting relaxation,  increasing ROM, reducing/eliminating soft tissue swelling/inflammation/restriction, improving soft tissue extensibility and allowing for proper ROM for normal function with self care, reaching, carrying, lifting, house/yardwork, driving/computer work    Modalities:      Charges:  Timed Code Treatment Minutes: 38   Total Treatment Minutes: 38     Time In: 12:47pm  Time Out: 1:25pm        [] EVAL (LOW) 30764 (typically 20 minutes face-to-face)  [] EVAL (MOD) 53303 (typically 30 minutes face-to-face)  [] EVAL (HIGH) 23598 (typically 45 minutes face-to-face)  [] RE-EVAL     [x] WI(77914) x 2    [] IONTO  [x] NMR (20213) x 0    [] VASO  [x] Manual (52221) x 1     [] Other:  [] TA x      [] Mech Traction (34272)  [] ES(attended) (64181)      [] ES (un) (21795):     GOALS:  Patient stated goal: Patient will be able to reach out to his right side without the onset of right shoulder pain. [x] Progressing: [] Met: [] Not Met: [] Adjusted    Therapist goals for Patient:   Short Term Goals: To be achieved in: 2 weeks  1. Independent in HEP and progression per patient tolerance, in order to prevent re-injury. [] Progressing: [x] Met: [] Not Met: [] Adjusted  2. Patient will have a decrease in pain to facilitate improvement in movement, function, and ADLs as indicated by Functional Deficits. [] Progressing: [x] Met: [] Not Met: [] Adjusted    Long Term Goals: To be achieved in: 8 weeks  1. Disability index score of 7% or less for the [facility] to assist with reaching prior level of function. [x] Progressing: [] Met: [] Not Met: [] Adjusted  2. Patient will demonstrate symmetrical and pain-free arm elevation between the affected and unaffected side. [] Progressing: [x] Met: [] Not Met: [] Adjusted  3. Patient will be able to sleep on his right side without the onset of right shoulder pain. [x] Progressing: [] Met: [] Not Met: [] Adjusted  4. Patient will be able to mimic UE painting motions without the onset of right shoulder pain. [] Progressing: [x] Met: [] Not Met: [] Adjusted  5. Patient will be able to play pickleball without the onset of right shoulder pain. [x] Progressing: [] Met: [] Not Met: [] Adjusted     Progression Towards Functional goals:  [x] Patient is progressing as expected towards functional goals listed. [] Progression is slowed due to complexities listed. [] Progression has been slowed due to co-morbidities. [] Plan just implemented, too soon to assess goals progression  [] Other:     ASSESSMENT:  Patient tolerated treatment well. He was able to return to pickleball last week with only mild onset of pain. PT will continue to focus on progressive reloading of his right shoulder girdle to improve right UE function. He would benefit from further skilled PT intervention in order to improve right shoulder girdle mobility, muscular endurance, and strength in order to return to his prior level of function and activity without the onset of right shoulder pain. Overall Progression Towards Functional goals/ Treatment Progress Update:  [x] Patient is progressing as expected towards functional goals listed. [] Progression is slowed due to complexities/Impairments listed. [] Progression has been slowed due to co-morbidities.   [] Plan just implemented, too soon to assess goals progression <30days   [] Goals require adjustment due to lack of progress  [] Patient is not progressing as expected and requires additional follow up with physician  [] Other    Prognosis for POC: [x] Good [] Fair  [] Poor      Patient requires continued skilled intervention: [x] Yes  [] No    Treatment/Activity Tolerance:  [x] Patient able to complete treatment  [] Patient limited by fatigue  [] Patient limited by pain    [] Patient limited by other medical complications  [] Other:         Return to Play: (if applicable)   []  Stage 1: Intro to Strength   []  Stage 2: Return to Run and Strength   []  Stage 3: Return to Jump and Strength   []  Stage 4: Dynamic Strength and Agility   []  Stage 5: Sport Specific Training     []  Ready to Return to Play, Meets All Above Stages   [] Not Ready for Return to Sports   Comments:                               PLAN: 1-2x per week for 8 weeks (beginning 10/28/20, ending 12/23/20)  [x] Continue per plan of care [] Rei York current plan (see comments above)  [] Plan of care initiated [] Hold pending MD visit [] Discharge      Electronically signed by:  Co-treated by Cleo Naylor, SPT and Sofia Storm PT, DPT ([place] PT License #: [id number])  The supervising physical therapist was present and directed all patient care. Note: If patient does not return for scheduled/ recommended follow up visits, this note will serve as a discharge from care along with most recent update on progress.

## 2020-11-19 ENCOUNTER — HOSPITAL ENCOUNTER (OUTPATIENT)
Dept: PHYSICAL THERAPY | Age: 70
Setting detail: THERAPIES SERIES
Discharge: HOME | End: 2020-11-19
Payer: MEDICARE

## 2020-11-19 NOTE — FLOWSHEET NOTE
[facility], [address]        Physical Therapy  Cancellation/No-show Note  Patient Name:  Ricky Hadley  :     Date:  2020  Cancelled visits to date: 0  No-shows to date: 1    For today's appointment patient:  ?  Cancelled  ? Rescheduled appointment  X  No-show     Reason given by patient:  ?  Patient ill  ? Conflicting appointment  ? No transportation    ? Conflict with work  ? No reason given  ?   Other:     Comments:      Electronically signed by:  Inez Arnold PT, DPT ([place] PT License #: [id number])

## 2020-11-23 ENCOUNTER — HOSPITAL ENCOUNTER (OUTPATIENT)
Dept: PHYSICAL THERAPY | Age: 70
Setting detail: THERAPIES SERIES
Discharge: HOME | End: 2020-11-23
Payer: MEDICARE

## 2020-11-23 PROCEDURE — 97110 THERAPEUTIC EXERCISES: CPT

## 2020-11-23 PROCEDURE — 97124 MASSAGE THERAPY: CPT

## 2020-11-23 NOTE — FLOWSHEET NOTE
[facility],  [address] [place]  Phone: [phone]  Fax [phone]        Date:  2020    Patient Name:  Lorraine Buchanan    :  1950  MRN: [id number]  Restrictions/Precautions:    Medical/Treatment Diagnosis Information:  Diagnosis: X58.579 (ICD-10-CM) - Right shoulder pain, unspecified chronicity; S46.001 (ICD-10-CM) - Injury of right rotator cuff  Treatment Diagnosis: Right Shoulder Pain (M25.511); Right Rotator Cuff Tendinopathy (V73.842)  Insurance/Certification information:  PT Insurance Information: MED MUT  Physician Information:  Referring Practitioner: Asim Gee MD  Has the plan of care been signed (Y/N):        [x]  Yes  []  No     Date of Patient follow up with Physician: 20      Is this a Progress Report:     []  Yes  [x]  No        If Yes:  Date Range for reporting period:  Beginning: 10/28/20  Ending:    Progress report will be due (10 Rx or 30 days whichever is less): 54       Recertification will be due (POC Duration  / 90 days whichever is less): 20       Visit # Insurance Allowable Auth Required   8 BMN []  Yes [x]  No        Functional Scale (QuickDASH):  10/28/20: 15.9% Disability (18/55)       Therapy Diagnosis/Practice Pattern: E     Number of Comorbidities:  []0     [x]1-2    []3+    Latex Allergy:  [x]NO      []YES  Preferred Language for Healthcare:   [x]English       []other:      Pain level:  None at rest; no with active flexion; mild pain at end-range active abduction. SUBJECTIVE:  Patient notes some mild shoulder soreness, but overall not much. 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 He has and extension piece he will utilize to decrease stress on his shoulders with paining higher portions of the walls and ceiling.     OBJECTIVE:   · Mild hypomobility of posterior an inferionglide at right GHJ. · UE active flexion symmetrical and pain free bilaterally. · UE active abduction symmetrical bilaterally with mild pain at end-range on the right. RESTRICTIONS/PRECAUTIONS: None    Exercises/Interventions:     Therapeutic Ex (77445) Volume Notes/CUES   Finisher Circuit (2x):  -Push/Pull  -Ladder  -Inward Circles  -Outward Circles  -GHJ Flexion + Torso FLexion   12x, 6#  12x, 6#  12x, 6#  12x, 6#  12x on right, 10#    Circuit (3x):  -SA Trampoline Chest Pass  -Overhead Trampoine Toss   12x on right, 6#  12x, 2#    Circuit (2x):  -Serratus Wall SLides  -Low Trap Lift Offs  -TB IR  -DB Scaption  -TB ER   12x, yellow  12x  12x, green  12x, 3#  12x, green                                                 Manual Intervention (13719) 16 min    Grade II-IV posterior and inferior glides of the right GHJ     STM to right pec minor     STM to the right posterior RTC, teres major, and lat     Grade III upward rotation of the right scapula                    NMR re-education (93012)  CUES NEEDED                                                Therapeutic Activity (56153)                                   Additional time was spent explaining exercise instruction, exercise set up, and rest breaks. Patient Education:   · Continue current HEP. · Keep elbows in with reaching during painting as this will create more subacromial space for shoulder movement during painting. Access Code: [email or web address]   URL: [email or web address]   Date: 11/02/2020   Prepared by: Darline Kemp     Exercises  Supine Pectoralis Stretch - 12 reps - 2 sets - 1x daily - 7x weekly  Alphonsa Twan on Foam Roll - 12 reps - 2 sets - 1x daily - 7x weekly                   Scapular Wall Slides - 12 reps - 2 sets - 1x daily - 7x weekly  Low Trap Setting at [place] - 12 reps - 2 sets - 1x daily - 7x weekly  Shoulder Internal Rotation with Resistance - 12 reps - 2 sets - 1x daily - 7x weekly  Scaption with Dumbbells - 12 reps - 2 sets - 1x daily - 7x weekly  Shoulder External Rotation with Anchored Resistance - 12 reps - 2 sets - 1x daily - 7x weekly  Split Stance Chest Press with Resistance - 12 reps - 3 sets - 1x daily - 7x weekly  Staggered Stance Single Arm Row with Anchored Resistance - 12 reps - 3 sets - 1x daily - 7x weekly    Therapeutic Exercise and NMR EXR  [x] (10056) Provided verbal/tactile cueing for activities related to strengthening, flexibility, endurance, ROM  for improvements in scapular, scapulothoracic and UE control with self care, reaching, carrying, lifting, house/yardwork, driving/computer work.    [] (37441) Provided verbal/tactile cueing for activities related to improving balance, coordination, kinesthetic sense, posture, motor skill, proprioception  to assist with  scapular, scapulothoracic and UE control with self care, reaching, carrying, lifting, house/yardwork, driving/computer work. Therapeutic Activities:    [] (65876 or 63264) Provided verbal/tactile cueing for activities related to improving balance, coordination, kinesthetic sense, posture, motor skill, proprioception and motor activation to allow for proper function of scapular, scapulothoracic and UE control with self care, carrying, lifting, driving/computer work.      Home Exercise Program:    [x] (82477) Reviewed/Progressed HEP activities related to strengthening, flexibility, endurance, ROM of scapular, scapulothoracic and UE control with self care, reaching, carrying, lifting, house/yardwork, driving/computer work  [] (53666) Reviewed/Progressed HEP activities related to improving balance, coordination, kinesthetic sense, posture, motor skill, proprioception of scapular, scapulothoracic and UE control with self care, reaching, carrying, lifting, house/yardwork, driving/computer work      Manual Treatments:  PROM / STM / Oscillations-Mobs:  G-I, II, III, IV (PA's, Inf., Post.)  [x] (49424) Provided manual therapy to mobilize soft tissue/joints of cervical/CT, scapular GHJ and UE for the purpose of modulating pain, promoting relaxation,  increasing ROM, reducing/eliminating soft tissue swelling/inflammation/restriction, improving soft tissue extensibility and allowing for proper ROM for normal function with self care, reaching, carrying, lifting, house/yardwork, driving/computer work    Modalities:      Charges:  Timed Code Treatment Minutes: 40   Total Treatment Minutes: 40     Time In: 1:34pm  Time Out: 2:14pm        [] EVAL (LOW) 42756 (typically 20 minutes face-to-face)  [] EVAL (MOD) 61769 (typically 30 minutes face-to-face)  [] EVAL (HIGH) 92220 (typically 45 minutes face-to-face)  [] RE-EVAL     [x] PE(97578) x 2    [] IONTO  [] NMR (79444) x     [] VASO  [x] Manual (31153) x 1     [] Other:  [] TA x      [] Mech Traction (16896)  [] ES(attended) (87706)      [] ES (un) (33935):     GOALS:  Patient stated goal: Patient will be able to reach out to his right side without the onset of right shoulder pain. [x] Progressing: [] Met: [] Not Met: [] Adjusted    Therapist goals for Patient:   Short Term Goals: To be achieved in: 2 weeks  1. Independent in HEP and progression per patient tolerance, in order to prevent re-injury. [] Progressing: [x] Met: [] Not Met: [] Adjusted  2. Patient will have a decrease in pain to facilitate improvement in movement, function, and ADLs as indicated by Functional Deficits. [] Progressing: [x] Met: [] Not Met: [] Adjusted    Long Term Goals: To be achieved in: 8 weeks  1. Disability index score of 7% or less for the [facility] to assist with reaching prior level of function. [x] Progressing: [] Met: [] Not Met: [] Adjusted  2. Patient will demonstrate symmetrical and pain-free arm elevation between the affected and unaffected side. [] Progressing: [x] Met: [] Not Met: [] Adjusted  3. Patient will be able to sleep on his right side without the onset of right shoulder pain. [x] Progressing: [] Met: [] Not Met: [] Adjusted  4. Patient will be able to mimic UE painting motions without the onset of right shoulder pain. [] Progressing: [x] Met: [] Not Met: [] Adjusted  5. Patient will be able to play pickleball without the onset of right shoulder pain. [x] Progressing: [] Met: [] Not Met: [] Adjusted     Progression Towards Functional goals:  [x] Patient is progressing as expected towards functional goals listed. [] Progression is slowed due to complexities listed. [] Progression has been slowed due to co-morbidities. [] Plan just implemented, too soon to assess goals progression  [] Other:     ASSESSMENT:  Patient tolerated treatment well. He still has mild pain with shoulder abduction, but has been able to increase his activity level. PT will continue to focus on progressive reloading of his right shoulder girdle to improve right UE function. He would benefit from further skilled PT intervention in order to improve right shoulder girdle mobility, muscular endurance, and strength in order to return to his prior level of function and activity without the onset of right shoulder pain. Overall Progression Towards Functional goals/ Treatment Progress Update:  [x] Patient is progressing as expected towards functional goals listed. [] Progression is slowed due to complexities/Impairments listed. [] Progression has been slowed due to co-morbidities.   [] Plan just implemented, too soon to assess goals progression <30days   [] Goals require adjustment due to lack of progress  [] Patient is not progressing as expected and requires additional follow up with physician  [] Other    Prognosis for POC: [x] Good [] Fair  [] Poor      Patient requires continued skilled intervention: [x] Yes  [] No    Treatment/Activity Tolerance:  [x] Patient able to complete treatment  [] Patient limited by fatigue  [] Patient limited by pain    [] Patient limited by other medical complications  [] Other:         Return to Play: (if applicable)   []  Stage 1: Intro to Strength   []  Stage 2: Return to Run and Strength   []  Stage 3: Return to Jump and Strength   []  Stage 4: Dynamic Strength and Agility   []  Stage 5: Sport Specific Training     []  Ready to Return to Play, Meets All Above Stages   []  Not Ready for Return to Sports   Comments:                               PLAN: 1-2x per week for 8 weeks (beginning 10/28/20, ending 12/23/20)  [x] Continue per plan of care [] Dianne Running current plan (see comments above)  [] Plan of care initiated [] Hold pending MD visit [] Discharge      Electronically signed by:  Co-treated by Tanya Cao SPT and Quin Kawasaki, PT, DPT ([place] PT License #: [id number])  The supervising physical therapist was present and directed all patient care. Note: If patient does not return for scheduled/ recommended follow up visits, this note will serve as a discharge from care along with most recent update on progress.

## 2020-11-30 ENCOUNTER — HOSPITAL ENCOUNTER (OUTPATIENT)
Dept: PHYSICAL THERAPY | Age: 70
Setting detail: THERAPIES SERIES
Discharge: HOME | End: 2020-11-30
Payer: MEDICARE

## 2020-11-30 PROCEDURE — 97140 MANUAL THERAPY 1/> REGIONS: CPT

## 2020-11-30 PROCEDURE — 97110 THERAPEUTIC EXERCISES: CPT

## 2020-11-30 NOTE — FLOWSHEET NOTE
[facility],  [address] [place]  Phone: [phone]  Fax [phone]        Date:  2020    Patient Name:  Jairon Wilks    :  1950  MRN: [id number]  Restrictions/Precautions:    Medical/Treatment Diagnosis Information:  Diagnosis: K43.534 (ICD-10-CM) - Right shoulder pain, unspecified chronicity; S46.001 (ICD-10-CM) - Injury of right rotator cuff  Treatment Diagnosis: Right Shoulder Pain (M25.511); Right Rotator Cuff Tendinopathy (Y23.629)  Insurance/Certification information:  PT Insurance Information: MED MUT  Physician Information:  Referring Practitioner: Angela Valdez MD  Has the plan of care been signed (Y/N):        [x]  Yes  []  No     Date of Patient follow up with Physician: 20      Is this a Progress Report:     [x]  Yes  []  No        If Yes:  Date Range for reporting period:  Beginning: 10/28/20  Endin20    Progress report will be due (10 Rx or 30 days whichever is less):        Recertification will be due (POC Duration  / 90 days whichever is less): 20       Visit # Insurance Allowable Auth Required   9 BMN []  Yes [x]  No        Functional Scale (QuickDASH):  10/28/20: 15.9% Disability (18/55)       Therapy Diagnosis/Practice Pattern: E     Number of Comorbidities:  []0     [x]1-2    []3+    Latex Allergy:  [x]NO      []YES  Preferred Language for Healthcare:   [x]English       []other:      Pain level:  None at rest; no with active flexion; mild pain at end-range active abduction. SUBJECTIVE:  Patient notes increased soreness yesterday after moving large sun umbrellas from his patio into the garage, 7/10. This persisted in the evening, making TB ER in his HEP difficult. He notes minimal pain with painting over the weekend prior to moving the umbrellas. OBJECTIVE:   · Mild hypomobility of posterior an inferior glide at right GHJ.   · UE active abduction symmetrical bilaterally with mild pain at end-range on the right. RESTRICTIONS/PRECAUTIONS: None    Exercises/Interventions:     Therapeutic Ex (76503) Volume Notes/CUES   Manually resisted Sidelying ER 3x12 on right    Circuit (2x):  -Serratus Wall Slides  -Low Trap Lift Offs  -TB IR  -DB Scaption  -TB ER   12x, yellow  12x  12x, red  12x, 3#  12x, red    Posterior RTC Stretch 6x3\" Visual, Verbal, and tactile cuing provided. Manual Intervention (68636) 19 min    Grade II-IV posterior and inferior glides of the right GHJ     STM to right pec minor     STM to the right posterior RTC, teres major, and lat     Grade III upward rotation of the right scapula                    NMR re-education (85814) 8 min CUES NEEDED   Rhythmic Stabilization(3x)  -90deg GHJ Flexion  -120deg GHJ Flexion  -120deg GHJ Scaption   12x, 4-way  12x, 4-way  12x, 4-way                                            Therapeutic Activity (77254)                                   Additional time was spent explaining exercise instruction, exercise set up, and rest breaks. Patient Education:   · Add cross-body posterior RTC stretch to HEP. Patient displayed good understanding. Access Code: [email or web address]   URL: [employer]/   Date: 11/02/2020   Prepared by: Mercedes Blackmon     Exercises  Supine Pectoralis Stretch - 12 reps - 2 sets - 1x daily - 7x weekly  Vanessa Nargis on Foam Roll - 12 reps - 2 sets - 1x daily - 7x weekly                   Scapular Wall Slides - 12 reps - 2 sets - 1x daily - 7x weekly  Low Trap Setting at [place] - 12 reps - 2 sets - 1x daily - 7x weekly  Shoulder Internal Rotation with Resistance - 12 reps - 2 sets - 1x daily - 7x weekly  Scaption with Dumbbells - 12 reps - 2 sets - 1x daily - 7x weekly  Shoulder External Rotation with Anchored Resistance - 12 reps - 2 sets - 1x daily - 7x weekly  Split Stance Chest Press with Resistance - 12 reps - 3 sets - 1x daily - 7x weekly  Staggered Stance Single Arm Row with Anchored Resistance - 12 reps - 3 sets - 1x daily - 7x weekly    Therapeutic Exercise and NMR EXR  [x] (18323) Provided verbal/tactile cueing for activities related to strengthening, flexibility, endurance, ROM  for improvements in scapular, scapulothoracic and UE control with self care, reaching, carrying, lifting, house/yardwork, driving/computer work.    [] (14130) Provided verbal/tactile cueing for activities related to improving balance, coordination, kinesthetic sense, posture, motor skill, proprioception  to assist with  scapular, scapulothoracic and UE control with self care, reaching, carrying, lifting, house/yardwork, driving/computer work. Therapeutic Activities:    [] (76957 or 87294) Provided verbal/tactile cueing for activities related to improving balance, coordination, kinesthetic sense, posture, motor skill, proprioception and motor activation to allow for proper function of scapular, scapulothoracic and UE control with self care, carrying, lifting, driving/computer work.      Home Exercise Program:    [x] (25807) Reviewed/Progressed HEP activities related to strengthening, flexibility, endurance, ROM of scapular, scapulothoracic and UE control with self care, reaching, carrying, lifting, house/yardwork, driving/computer work  [] (17825) Reviewed/Progressed HEP activities related to improving balance, coordination, kinesthetic sense, posture, motor skill, proprioception of scapular, scapulothoracic and UE control with self care, reaching, carrying, lifting, house/yardwork, driving/computer work      Manual Treatments:  PROM / STM / Oscillations-Mobs:  G-I, II, III, IV (PA's, Inf., Post.)  [x] (03507) Provided manual therapy to mobilize soft tissue/joints of cervical/CT, scapular GHJ and UE for the purpose of modulating pain, promoting relaxation,  increasing ROM, reducing/eliminating soft tissue swelling/inflammation/restriction, improving soft tissue extensibility and allowing for proper ROM for normal function with self care, reaching, carrying, lifting, house/yardwork, driving/computer work    Modalities:      Charges:  Timed Code Treatment Minutes: 47   Total Treatment Minutes: 47     Time In: 1:31pm  Time Out: 2:18pm        [] EVAL (LOW) 33824 (typically 20 minutes face-to-face)  [] EVAL (MOD) 49847 (typically 30 minutes face-to-face)  [] EVAL (HIGH) 07797 (typically 45 minutes face-to-face)  [] RE-EVAL     [x] CX(53076) x 1    [] IONTO  [x] NMR (21315) x 1    [] VASO  [x] Manual (62638) x 1     [] Other:  [] TA x      [] Mech Traction (84984)  [] ES(attended) (02981)      [] ES (un) (40198):     GOALS:  Patient stated goal: Patient will be able to reach out to his right side without the onset of right shoulder pain. [x] Progressing: [] Met: [] Not Met: [] Adjusted    Therapist goals for Patient:   Short Term Goals: To be achieved in: 2 weeks  1. Independent in HEP and progression per patient tolerance, in order to prevent re-injury. [] Progressing: [x] Met: [] Not Met: [] Adjusted  2. Patient will have a decrease in pain to facilitate improvement in movement, function, and ADLs as indicated by Functional Deficits. [] Progressing: [x] Met: [] Not Met: [] Adjusted    Long Term Goals: To be achieved in: 8 weeks  1. Disability index score of 7% or less for the [facility] to assist with reaching prior level of function. [x] Progressing: [] Met: [] Not Met: [] Adjusted  2. Patient will demonstrate symmetrical and pain-free arm elevation between the affected and unaffected side. [] Progressing: [x] Met: [] Not Met: [] Adjusted  3. Patient will be able to sleep on his right side without the onset of right shoulder pain. [x] Progressing: [] Met: [] Not Met: [] Adjusted  4. Patient will be able to mimic UE painting motions without the onset of right shoulder pain. [] Progressing: [x] Met: [] Not Met: [] Adjusted  5.  Patient will be able to play pickleball Agility   []  Stage 5: Sport Specific Training     []  Ready to Return to Play, Meets All Above Stages   []  Not Ready for Return to Sports   Comments:                               PLAN: 1-2x per week for 8 weeks (beginning 10/28/20, ending 12/23/20)  [x] Continue per plan of care [] Remy Rao current plan (see comments above)  [] Plan of care initiated [] Hold pending MD visit [] Discharge      Electronically signed by:  Co-treated by MARY KAY Pereira and Darline Kemp PT, DPT ([place] PT License #: [id number])  The supervising physical therapist was present and directed all patient care. Note: If patient does not return for scheduled/ recommended follow up visits, this note will serve as a discharge from care along with most recent update on progress.

## 2020-12-03 ENCOUNTER — APPOINTMENT (OUTPATIENT)
Dept: PHYSICAL THERAPY | Age: 70
End: 2020-12-03
Payer: MEDICARE

## 2020-12-07 ENCOUNTER — HOSPITAL ENCOUNTER (OUTPATIENT)
Dept: PHYSICAL THERAPY | Age: 70
Setting detail: THERAPIES SERIES
Discharge: HOME | End: 2020-12-07
Payer: MEDICARE

## 2020-12-07 ENCOUNTER — OFFICE VISIT (OUTPATIENT)
Dept: ORTHOPEDIC SURGERY | Age: 70
End: 2020-12-07
Payer: MEDICARE

## 2020-12-07 VITALS — WEIGHT: 214 LBS | BODY MASS INDEX: 28.99 KG/M2 | HEIGHT: 72 IN

## 2020-12-07 PROCEDURE — 97110 THERAPEUTIC EXERCISES: CPT

## 2020-12-07 PROCEDURE — 99213 OFFICE O/P EST LOW 20 MIN: CPT | Performed by: PHYSICIAN ASSISTANT

## 2020-12-07 PROCEDURE — 97140 MANUAL THERAPY 1/> REGIONS: CPT

## 2020-12-07 NOTE — FLOWSHEET NOTE
[facility], [address]  Phone: [phone]  Fax [phone]        Date:  2020    Patient Name:  Bela Carreno    :  1950  MRN: [id number]  Restrictions/Precautions:    Medical/Treatment Diagnosis Information:  Diagnosis: B35.671 (ICD-10-CM) - Right shoulder pain, unspecified chronicity; S46.001 (ICD-10-CM) - Injury of right rotator cuff  Treatment Diagnosis: Right Shoulder Pain (M25.511); Right Rotator Cuff Tendinopathy (F07.134)  Insurance/Certification information:  PT Insurance Information: MED MUT  Physician Information:  Referring Practitioner: Andres Grullon MD  Has the plan of care been signed (Y/N):        [x]  Yes  []  No     Date of Patient follow up with Physician: 20      Is this a Progress Report:     []  Yes  [x]  No        If Yes:  Date Range for reporting period:  Beginnin20  Ending:     Progress report will be due (10 Rx or 30 days whichever is less):        Recertification will be due (POC Duration  / 90 days whichever is less): 20       Visit # Insurance Allowable Auth Required   10 BMN []  Yes [x]  No        Functional Scale (QuickDASH):  10/28/20: 15.9% Disability (18/55)  20: 15.9% Disability (19/55, sent back via email on 20)       Therapy Diagnosis/Practice Pattern: E     Number of Comorbidities:  []0     [x]1-2    []3+    Latex Allergy:  [x]NO      []YES  Preferred Language for Healthcare:   [x]English       []other:      Pain level:  None at rest; mild pain at end-range active abduction. SUBJECTIVE:  Patient notes he feels like he is getting better but that his symptoms are lingering. He thinks it will just take time. OBJECTIVE:   · Mild hypomobility of posterior an inferior glide at right GHJ. · UE active abduction symmetrical bilaterally with mild pain at end-range on the right.       RESTRICTIONS/PRECAUTIONS: None    Exercises/Interventions:     Therapeutic Ex (40274) Volume Notes/CUES   Circuit (3x):  -DoorJam Pec Stretch  -Posterior RTC Stretch  -Serratus Wall Slides  -Low Trap Lift Offs  -TB IR  -DB Scaption  -TB ER   12x3\"  12x3\"  12x, yellow  12x  12x, red  12x,   12x, red Visual and verbal cuing provided for pec stretch. Visual, Verbal, and tactile cuing provided For posterior RTC Stretch. Circuit (2x)  -SA TB Chest Press  -SA TB Row   12x on right, black  12x on right, black                                       Manual Intervention (52938) 16 min    Grade II-IV posterior and inferior glides of the right GHJ     STM to the right posterior RTC, teres major, and lat     Grade III upward rotation of the right scapula     Serratus MET               NMR re-education (35463)  CUES NEEDED                                                Therapeutic Activity (18574)                                   Additional time was spent explaining exercise instruction, exercise set up, and rest breaks. Patient Education:   · HEP updated; provided new handout. Provided black theraband for home. Access Code: [email or web address]   URL: [employer]/   Date: 12/07/2020   Prepared by: Jose Hines     Exercises  Single Arm Doorway Pec Stretch at 90 Degrees Abduction - 12 reps - 2 sets - 3\" hold - 1x daily - 7x weekly  Standing Shoulder Posterior Capsule Stretch - 12 reps - 2 sets - 3 sec hold - 1x daily - 7x weekly  Scapular Wall Slides - 12 reps - 2 sets - 1x daily - 7x weekly  Low Trap Setting at [address] - 12 reps - 2 sets - 1x daily - 7x weekly  Shoulder Internal Rotation with Resistance - 12 reps - 2 sets - 1x daily - 7x weekly  Scaption with Dumbbells - 12 reps - 2 sets - 1x daily - 7x weekly  Shoulder External Rotation with Anchored Resistance - 12 reps - 2 sets - 1x daily - 7x weekly  Split Stance Chest Press with Resistance - 12 reps - 2 sets - 1x daily - 7x weekly  Staggered Stance Single Arm Row with Anchored Resistance - 12 reps - 2 sets - 1x daily - 7x weekly    Therapeutic Exercise and NMR EXR  [x] (26721) Provided verbal/tactile cueing for activities related to strengthening, flexibility, endurance, ROM  for improvements in scapular, scapulothoracic and UE control with self care, reaching, carrying, lifting, house/yardwork, driving/computer work.    [] (88088) Provided verbal/tactile cueing for activities related to improving balance, coordination, kinesthetic sense, posture, motor skill, proprioception  to assist with  scapular, scapulothoracic and UE control with self care, reaching, carrying, lifting, house/yardwork, driving/computer work. Therapeutic Activities:    [] (53016 or 92515) Provided verbal/tactile cueing for activities related to improving balance, coordination, kinesthetic sense, posture, motor skill, proprioception and motor activation to allow for proper function of scapular, scapulothoracic and UE control with self care, carrying, lifting, driving/computer work.      Home Exercise Program:    [x] (69810) Reviewed/Progressed HEP activities related to strengthening, flexibility, endurance, ROM of scapular, scapulothoracic and UE control with self care, reaching, carrying, lifting, house/yardwork, driving/computer work  [] (57618) Reviewed/Progressed HEP activities related to improving balance, coordination, kinesthetic sense, posture, motor skill, proprioception of scapular, scapulothoracic and UE control with self care, reaching, carrying, lifting, house/yardwork, driving/computer work      Manual Treatments:  PROM / STM / Oscillations-Mobs:  G-I, II, III, IV (PA's, Inf., Post.)  [x] (55921) Provided manual therapy to mobilize soft tissue/joints of cervical/CT, scapular GHJ and UE for the purpose of modulating pain, promoting relaxation,  increasing ROM, reducing/eliminating soft tissue swelling/inflammation/restriction, improving soft tissue extensibility and allowing for proper ROM for normal function with self care, reaching, carrying, lifting, house/yardwork, driving/computer work    Modalities:      Charges:  Timed Code Treatment Minutes: 42   Total Treatment Minutes: 42     Time In: 11:20am  Time Out: 12:02pm        [] EVAL (LOW) 39144 (typically 20 minutes face-to-face)  [] EVAL (MOD) 06085 (typically 30 minutes face-to-face)  [] EVAL (HIGH) 75180 (typically 45 minutes face-to-face)  [] RE-EVAL     [x] OT(09845) x 2    [] IONTO  [] NMR (67467) x     [] VASO  [x] Manual (92758) x 1     [] Other:  [] TA x      [] Mech Traction (94859)  [] ES(attended) (77686)      [] ES (un) (11045):     GOALS:  Patient stated goal: Patient will be able to reach out to his right side without the onset of right shoulder pain. [x] Progressing: [] Met: [] Not Met: [] Adjusted    Therapist goals for Patient:   Short Term Goals: To be achieved in: 2 weeks  1. Independent in HEP and progression per patient tolerance, in order to prevent re-injury. [] Progressing: [x] Met: [] Not Met: [] Adjusted  2. Patient will have a decrease in pain to facilitate improvement in movement, function, and ADLs as indicated by Functional Deficits. [] Progressing: [x] Met: [] Not Met: [] Adjusted    Long Term Goals: To be achieved in: 8 weeks  1. Disability index score of 7% or less for the [facility] to assist with reaching prior level of function. [x] Progressing: [] Met: [] Not Met: [] Adjusted  2. Patient will demonstrate symmetrical and pain-free arm elevation between the affected and unaffected side. [] Progressing: [x] Met: [] Not Met: [] Adjusted  3. Patient will be able to sleep on his right side without the onset of right shoulder pain. [x] Progressing: [] Met: [] Not Met: [] Adjusted  4. Patient will be able to mimic UE painting motions without the onset of right shoulder pain. [] Progressing: [x] Met: [] Not Met: [] Adjusted  5. Patient will be able to play pickleball without the onset of right shoulder pain.    [x] Progressing: [] Met: [] Not Met: [] Adjusted     Progression Towards Functional goals:  [x] Patient is progressing as expected towards functional goals listed. [] Progression is slowed due to complexities listed. [] Progression has been slowed due to co-morbidities. [] Plan just implemented, too soon to assess goals progression  [] Other:     ASSESSMENT:  Patient tolerated treatment well. He still has mild pain with shoulder abduction. He reports his symptoms have improved. PT will continue to focus on progressive reloading of his right shoulder girdle to improve right UE function. His HEP has bee updated. He will work diligently on it over the next two weeks before his next follow-up. He would benefit from further skilled PT intervention in order to improve right shoulder girdle mobility, muscular endurance, and strength in order to return to his prior level of function and activity without the onset of right shoulder pain. Overall Progression Towards Functional goals/ Treatment Progress Update:  [x] Patient is progressing as expected towards functional goals listed. [] Progression is slowed due to complexities/Impairments listed. [] Progression has been slowed due to co-morbidities.   [] Plan just implemented, too soon to assess goals progression <30days   [] Goals require adjustment due to lack of progress  [] Patient is not progressing as expected and requires additional follow up with physician  [] Other    Prognosis for POC: [x] Good [] Fair  [] Poor      Patient requires continued skilled intervention: [x] Yes  [] No    Treatment/Activity Tolerance:  [x] Patient able to complete treatment  [] Patient limited by fatigue  [] Patient limited by pain    [] Patient limited by other medical complications  [] Other:         Return to Play: (if applicable)   []  Stage 1: Intro to Strength   []  Stage 2: Return to Run and Strength   []  Stage 3: Return to Jump and Strength   []  Stage 4: Dynamic Strength and Agility   []  Stage

## 2020-12-20 NOTE — PROGRESS NOTES
SHOULDER FOLLOW-UP    Referring Provider: Dr. Treva Zarate    Primary Care Provider: Dr. Ene Klein    Chief Complaint    Follow-up (Right Shoulder RCT doing better some improvement with therapy )      History of Present Illness:  Rah Grullon is a 65 y.o. male who is here today for follow-up regarding his right shoulder pain. He was last seen approximately 6 weeks ago with what appeared to be an acute exacerbation of chronic rotator cuff pathology. Is very active right-hand-dominant gentleman for his age. He has had some low-grade problems with the shoulder but never to this degree. He had an incident recently while playing pickle ball when he felt a bit of a pop. His functionality has been fairly good throughout this experience. At his last visit we got him started on a regular anti-inflammatory and prescribed meloxicam 15 mg daily. He reports he has not noticed a huge difference with it but overall his pain has improved. He also got started in outpatient physical therapy which he seems to be doing well. He believes at this point he is comfortable doing the exercises on his own. Patient comes today for follow-up and seems a bit frustrated that his pain is not completely gone however he is very functional.  His shoulder pain has not stopped him from doing any activities. He is able to go to the gym and even played pickle ball last week. Pain Assessment  Location of Pain: Shoulder  Location Modifiers: Right  Severity of Pain: 2  Quality of Pain: Aching, Dull  Frequency of Pain: Intermittent  Aggravating Factors: Stretching, Straightening  Relieving Factors: Rest  Result of Injury: Yes  Work-Related Injury: No  Are there other pain locations you wish to document?: No    Medical History:  Patient's medications, allergies, past medical, surgical, social and family histories were reviewed and updated as appropriate.     Review of Systems:  Pertinent items are noted in HPI  Review of systems reviewed from Patient History Form dated on October 21, 2020 and available in the patient's chart under the Media tab. Vitals:    12/07/20 0846   Weight: 214 lb (97.1 kg)   Height: 6' (1.829 m)           General Exam:   Constitutional: Patient is adequately groomed with no evidence of malnutrition  Mental Status: The patient is oriented to time, place and person. The patient's mood and affect are appropriate. Vascular: Examination reveals no swelling or calf tenderness. Peripheral pulses are palpable and 2+. Neurological: The patient has good coordination. There is no weakness or sensory deficit. Right shoulder Examination:  Inspection: No gross deformities noted. No ecchymosis or erythema. Palpation: Subtle tenderness at the apex the bicipital groove    Range of Motion: Is very nice arc of glenohumeral movement with good scapulohumeral rhythm    Strength: Isometric strength testing is full    Special Tests: Normal sensation light touch axillary nerve, pain with Jobes and O'Briens is modest.  Mild Neer and Gr. Nontender acromioclavicular. He does indeed have some positive pain with long head biceps provocative    Skin: There are no rashes, ulcerations or lesions. Gait: No assist device    Spine good cervical rotation      Radiology:     No new x-rays taken today      Assessment :   Right shoulder possible chronic rotator cuff pathology with acute exacerbation. Office Procedures:  No orders of the defined types were placed in this encounter. Treatment Plan: At this time, the patient seems to be improving even though he feels it slower than he would like. He is going to continue his home exercise program  We also discussed the possibility of cutting back on his meloxicam as he does not feel that it is helping him much. We discussed the possibility of trying a different anti-inflammatory however he like to hold off on that for now.   Patient will follow-up with us on a as needed basis. We discussed the possibility of a cortisone injection in the future if necessary. [facility]  A Partner of [facility] ([facility])      This dictation was performed with a verbal recognition program (DRAGON) and it was checked for errors. It is possible that there are still dictated errors within this office note. If so, please bring any errors to my attention for an addendum. All efforts were made to ensure that this office note is accurate. Negative

## 2020-12-21 ENCOUNTER — APPOINTMENT (OUTPATIENT)
Dept: PHYSICAL THERAPY | Age: 70
End: 2020-12-21
Payer: MEDICARE

## 2024-01-19 ENCOUNTER — APPOINTMENT (OUTPATIENT)
Dept: CT IMAGING | Age: 74
End: 2024-01-19
Payer: MEDICARE

## 2024-01-19 ENCOUNTER — HOSPITAL ENCOUNTER (EMERGENCY)
Age: 74
Discharge: HOME | End: 2024-01-19
Attending: STUDENT IN AN ORGANIZED HEALTH CARE EDUCATION/TRAINING PROGRAM
Payer: MEDICARE

## 2024-01-19 VITALS
OXYGEN SATURATION: 95 % | WEIGHT: 230 LBS | RESPIRATION RATE: 18 BRPM | HEART RATE: 64 BPM | HEIGHT: 73 IN | DIASTOLIC BLOOD PRESSURE: 65 MMHG | SYSTOLIC BLOOD PRESSURE: 130 MMHG | TEMPERATURE: 97.5 F | BODY MASS INDEX: 30.48 KG/M2

## 2024-01-19 DIAGNOSIS — K40.90 REDUCIBLE RIGHT INGUINAL HERNIA: Primary | ICD-10-CM

## 2024-01-19 DIAGNOSIS — K43.2 INCISIONAL HERNIA, WITHOUT OBSTRUCTION OR GANGRENE: ICD-10-CM

## 2024-01-19 DIAGNOSIS — R10.31 ACUTE RIGHT LOWER QUADRANT PAIN: ICD-10-CM

## 2024-01-19 LAB
ALBUMIN SERPL-MCNC: 4.1 G/DL (ref 3.4–5)
ALBUMIN/GLOB SERPL: 1.9 {RATIO} (ref 1.1–2.2)
ALP SERPL-CCNC: 96 U/L (ref 40–129)
ALT SERPL-CCNC: 26 U/L (ref 10–40)
ANION GAP SERPL CALCULATED.3IONS-SCNC: 10 MMOL/L (ref 3–16)
AST SERPL-CCNC: 32 U/L (ref 15–37)
BASOPHILS # BLD: 0.1 K/UL (ref 0–0.2)
BASOPHILS NFR BLD: 1.2 %
BILIRUB SERPL-MCNC: <0.2 MG/DL (ref 0–1)
BILIRUB UR QL STRIP.AUTO: NEGATIVE
BUN SERPL-MCNC: 17 MG/DL (ref 7–20)
CALCIUM SERPL-MCNC: 9.3 MG/DL (ref 8.3–10.6)
CHLORIDE SERPL-SCNC: 102 MMOL/L (ref 99–110)
CLARITY UR: CLEAR
CO2 SERPL-SCNC: 28 MMOL/L (ref 21–32)
COLOR UR: YELLOW
CREAT SERPL-MCNC: 1.1 MG/DL (ref 0.8–1.3)
DEPRECATED RDW RBC AUTO: 13.3 % (ref 12.4–15.4)
EOSINOPHIL # BLD: 0.2 K/UL (ref 0–0.6)
EOSINOPHIL NFR BLD: 3.6 %
GFR SERPLBLD CREATININE-BSD FMLA CKD-EPI: >60 ML/MIN/{1.73_M2}
GLUCOSE SERPL-MCNC: 120 MG/DL (ref 70–99)
GLUCOSE UR STRIP.AUTO-MCNC: NEGATIVE MG/DL
HCT VFR BLD AUTO: 41.2 % (ref 40.5–52.5)
HGB BLD-MCNC: 13.6 G/DL (ref 13.5–17.5)
HGB UR QL STRIP.AUTO: NEGATIVE
KETONES UR STRIP.AUTO-MCNC: ABNORMAL MG/DL
LEUKOCYTE ESTERASE UR QL STRIP.AUTO: NEGATIVE
LIPASE SERPL-CCNC: 33 U/L (ref 13–60)
LYMPHOCYTES # BLD: 1 K/UL (ref 1–5.1)
LYMPHOCYTES NFR BLD: 18.8 %
MCH RBC QN AUTO: 32.2 PG (ref 26–34)
MCHC RBC AUTO-ENTMCNC: 33.1 G/DL (ref 31–36)
MCV RBC AUTO: 97.4 FL (ref 80–100)
MONOCYTES # BLD: 0.8 K/UL (ref 0–1.3)
MONOCYTES NFR BLD: 14.2 %
NEUTROPHILS # BLD: 3.4 K/UL (ref 1.7–7.7)
NEUTROPHILS NFR BLD: 62.2 %
NITRITE UR QL STRIP.AUTO: NEGATIVE
PH UR STRIP.AUTO: 6 [PH] (ref 5–8)
PLATELET # BLD AUTO: 182 K/UL (ref 135–450)
PMV BLD AUTO: 7.1 FL (ref 5–10.5)
POTASSIUM SERPL-SCNC: 4.2 MMOL/L (ref 3.5–5.1)
PROT SERPL-MCNC: 6.3 G/DL (ref 6.4–8.2)
PROT UR STRIP.AUTO-MCNC: NEGATIVE MG/DL
RBC # BLD AUTO: 4.23 M/UL (ref 4.2–5.9)
SODIUM SERPL-SCNC: 140 MMOL/L (ref 136–145)
SP GR UR STRIP.AUTO: >=1.03 (ref 1–1.03)
UA COMPLETE W REFLEX CULTURE PNL UR: ABNORMAL
UA DIPSTICK W REFLEX MICRO PNL UR: ABNORMAL
URN SPEC COLLECT METH UR: ABNORMAL
UROBILINOGEN UR STRIP-ACNC: 0.2 E.U./DL
WBC # BLD AUTO: 5.5 K/UL (ref 4–11)

## 2024-01-19 PROCEDURE — 6360000004 HC RX CONTRAST MEDICATION: Performed by: STUDENT IN AN ORGANIZED HEALTH CARE EDUCATION/TRAINING PROGRAM

## 2024-01-19 PROCEDURE — 80053 COMPREHEN METABOLIC PANEL: CPT

## 2024-01-19 PROCEDURE — 83690 ASSAY OF LIPASE: CPT

## 2024-01-19 PROCEDURE — 74177 CT ABD & PELVIS W/CONTRAST: CPT

## 2024-01-19 PROCEDURE — 81003 URINALYSIS AUTO W/O SCOPE: CPT

## 2024-01-19 PROCEDURE — 6370000000 HC RX 637 (ALT 250 FOR IP): Performed by: STUDENT IN AN ORGANIZED HEALTH CARE EDUCATION/TRAINING PROGRAM

## 2024-01-19 PROCEDURE — 99285 EMERGENCY DEPT VISIT HI MDM: CPT

## 2024-01-19 PROCEDURE — 85025 COMPLETE CBC W/AUTO DIFF WBC: CPT

## 2024-01-19 RX ORDER — LABETALOL 100 MG/1
3 TABLET, FILM COATED ORAL 2 TIMES DAILY
COMMUNITY
Start: 2022-05-03

## 2024-01-19 RX ORDER — OMEPRAZOLE 20 MG/1
CAPSULE, DELAYED RELEASE ORAL
COMMUNITY
Start: 2023-11-16

## 2024-01-19 RX ORDER — ACETAMINOPHEN 500 MG
1000 TABLET ORAL ONCE
Status: COMPLETED | OUTPATIENT
Start: 2024-01-19 | End: 2024-01-19

## 2024-01-19 RX ORDER — TAMSULOSIN HYDROCHLORIDE 0.4 MG/1
0.4 CAPSULE ORAL NIGHTLY
COMMUNITY
Start: 2022-10-20

## 2024-01-19 RX ORDER — CALCIUM CARBONATE/VITAMIN D3 500MG-5MCG
TABLET ORAL
COMMUNITY

## 2024-01-19 RX ADMIN — ACETAMINOPHEN 1000 MG: 500 TABLET ORAL at 00:57

## 2024-01-19 RX ADMIN — IOPAMIDOL 75 ML: 755 INJECTION, SOLUTION INTRAVENOUS at 01:44

## 2024-01-19 ASSESSMENT — PAIN DESCRIPTION - DESCRIPTORS: DESCRIPTORS: PINS AND NEEDLES

## 2024-01-19 ASSESSMENT — PAIN - FUNCTIONAL ASSESSMENT: PAIN_FUNCTIONAL_ASSESSMENT: 0-10

## 2024-01-19 ASSESSMENT — PAIN SCALES - GENERAL: PAINLEVEL_OUTOF10: 8

## 2024-01-19 ASSESSMENT — PAIN DESCRIPTION - ORIENTATION: ORIENTATION: MID

## 2024-01-19 ASSESSMENT — PAIN DESCRIPTION - LOCATION: LOCATION: ABDOMEN

## 2024-01-19 NOTE — CONSULTS
PS general surgery @ 1114  Per:  Ed Mcgregor MD  RE:  R inguinal hernia  MD Jai responded to page @ [phone]

## 2025-06-06 ENCOUNTER — HOSPITAL ENCOUNTER (OUTPATIENT)
Dept: CARDIAC REHAB | Age: 75
Setting detail: THERAPIES SERIES
Discharge: HOME | End: 2025-06-06
Payer: MEDICARE

## 2025-06-09 ENCOUNTER — HOSPITAL ENCOUNTER (OUTPATIENT)
Dept: CARDIAC REHAB | Age: 75
Setting detail: THERAPIES SERIES
Discharge: HOME | End: 2025-06-09
Payer: MEDICARE

## 2025-06-09 PROCEDURE — G0239 OTH RESP PROC, GROUP: HCPCS

## 2025-06-11 ENCOUNTER — HOSPITAL ENCOUNTER (OUTPATIENT)
Dept: CARDIAC REHAB | Age: 75
Setting detail: THERAPIES SERIES
Discharge: HOME | End: 2025-06-11
Payer: MEDICARE

## 2025-06-11 PROCEDURE — G0239 OTH RESP PROC, GROUP: HCPCS

## 2025-06-13 ENCOUNTER — HOSPITAL ENCOUNTER (OUTPATIENT)
Dept: CARDIAC REHAB | Age: 75
Setting detail: THERAPIES SERIES
Discharge: HOME | End: 2025-06-13
Payer: MEDICARE

## 2025-06-13 PROCEDURE — G0239 OTH RESP PROC, GROUP: HCPCS

## 2025-06-16 ENCOUNTER — HOSPITAL ENCOUNTER (OUTPATIENT)
Dept: CARDIAC REHAB | Age: 75
Setting detail: THERAPIES SERIES
Discharge: HOME | End: 2025-06-16
Payer: MEDICARE

## 2025-06-16 PROCEDURE — G0239 OTH RESP PROC, GROUP: HCPCS

## 2025-06-18 ENCOUNTER — HOSPITAL ENCOUNTER (OUTPATIENT)
Dept: CARDIAC REHAB | Age: 75
Setting detail: THERAPIES SERIES
Discharge: HOME | End: 2025-06-18
Payer: MEDICARE

## 2025-06-18 PROCEDURE — G0239 OTH RESP PROC, GROUP: HCPCS

## 2025-06-20 ENCOUNTER — HOSPITAL ENCOUNTER (OUTPATIENT)
Dept: CARDIAC REHAB | Age: 75
Setting detail: THERAPIES SERIES
Discharge: HOME | End: 2025-06-20
Payer: MEDICARE

## 2025-06-20 PROCEDURE — G0239 OTH RESP PROC, GROUP: HCPCS

## 2025-06-23 ENCOUNTER — HOSPITAL ENCOUNTER (OUTPATIENT)
Dept: CARDIAC REHAB | Age: 75
Setting detail: THERAPIES SERIES
Discharge: HOME | End: 2025-06-23
Payer: MEDICARE

## 2025-06-23 PROCEDURE — G0239 OTH RESP PROC, GROUP: HCPCS

## 2025-06-25 ENCOUNTER — HOSPITAL ENCOUNTER (OUTPATIENT)
Dept: CARDIAC REHAB | Age: 75
Setting detail: THERAPIES SERIES
Discharge: HOME | End: 2025-06-25
Payer: MEDICARE

## 2025-06-25 PROCEDURE — G0239 OTH RESP PROC, GROUP: HCPCS

## 2025-06-27 ENCOUNTER — HOSPITAL ENCOUNTER (OUTPATIENT)
Dept: CARDIAC REHAB | Age: 75
Setting detail: THERAPIES SERIES
Discharge: HOME | End: 2025-06-27
Payer: MEDICARE

## 2025-06-27 PROCEDURE — G0239 OTH RESP PROC, GROUP: HCPCS

## 2025-06-30 ENCOUNTER — APPOINTMENT (OUTPATIENT)
Dept: CARDIAC REHAB | Age: 75
End: 2025-06-30
Payer: MEDICARE

## (undated) DEVICE — BLADE OPHTH 180DEG CUT SURF BLU STR SHRP DBL BVL GRINDLESS

## (undated) DEVICE — NEEDLE HYPO 22GA L1 1/2IN PIVOTING SHLD FOR LUERLOCK SYR

## (undated) DEVICE — STERILE LATEX POWDER-FREE SURGICAL GLOVESWITH NITRILE COATING: Brand: PROTEXIS

## (undated) DEVICE — SUTURE ETHLN SZ 4-0 L18IN NONABSORBABLE BLK L19MM PS-2 3/8 1667H

## (undated) DEVICE — GLOVE SURG SZ 7 L12IN FNGR THK94MIL STD WHT ISOLEX LTX FREE

## (undated) DEVICE — Z CONVERTED USE 2273163 BANDAGE COMPR W3INXL4.5YD LTWT E EC SGL LAYERED CLP CLSR

## (undated) DEVICE — COTTON UNDERCAST PADDING,CRIMPED FINISH: Brand: WEBRIL

## (undated) DEVICE — NEEDLE HYPO 18GA L1.5IN THN WALL PIVOTING SHLD BVL ORIENTED

## (undated) DEVICE — SYRINGE MED 10ML LUERLOCK TIP W/O SFTY DISP